# Patient Record
Sex: FEMALE | Race: WHITE | NOT HISPANIC OR LATINO
[De-identification: names, ages, dates, MRNs, and addresses within clinical notes are randomized per-mention and may not be internally consistent; named-entity substitution may affect disease eponyms.]

---

## 2019-01-22 PROBLEM — Z00.00 ENCOUNTER FOR PREVENTIVE HEALTH EXAMINATION: Status: ACTIVE | Noted: 2019-01-22

## 2019-01-25 ENCOUNTER — APPOINTMENT (OUTPATIENT)
Dept: GYNECOLOGIC ONCOLOGY | Facility: CLINIC | Age: 19
End: 2019-01-25
Payer: COMMERCIAL

## 2019-01-25 DIAGNOSIS — Z80.3 FAMILY HISTORY OF MALIGNANT NEOPLASM OF BREAST: ICD-10-CM

## 2019-01-25 DIAGNOSIS — Z87.19 PERSONAL HISTORY OF OTHER DISEASES OF THE DIGESTIVE SYSTEM: ICD-10-CM

## 2019-01-25 DIAGNOSIS — Z82.49 FAMILY HISTORY OF ISCHEMIC HEART DISEASE AND OTHER DISEASES OF THE CIRCULATORY SYSTEM: ICD-10-CM

## 2019-01-25 PROCEDURE — 99245 OFF/OP CONSLTJ NEW/EST HI 55: CPT | Mod: 25

## 2019-01-25 PROCEDURE — 76857 US EXAM PELVIC LIMITED: CPT | Mod: 59

## 2019-01-25 RX ORDER — MULTIVITAMIN
TABLET ORAL
Refills: 0 | Status: ACTIVE | COMMUNITY

## 2019-01-25 NOTE — END OF VISIT
[FreeTextEntry3] : This note accurately reflects the work and decisions made by me.\par Written by Coretta BORJA, acting as a scribe for Dr. Donis Jordan.\par

## 2019-01-25 NOTE — CHIEF COMPLAINT
[FreeTextEntry1] : [facility]\par \par [facility] [phone] at [address] [place] [address]\par

## 2019-01-25 NOTE — HISTORY OF PRESENT ILLNESS
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 Pineda, at [facility]. Recently, patient has been experiencing pelvic discomfort and a pelvic sonogram on 12/26/18 revealed a complex cystic structure in the left ovary with lobulated and irregular margins increased in size from prior study. Pelvic MRI on 1/9/19 revealed left ovarian complex cystic lesion, 5.8 x 5.8 x 5.7cm with multiple enhancing mural nodule/papillary projections. She does not recall having a  drawn. Patient is not sexually active. \par \par Never had pap smear

## 2019-01-30 ENCOUNTER — OTHER (OUTPATIENT)
Age: 19
End: 2019-01-30

## 2019-02-11 ENCOUNTER — OTHER (OUTPATIENT)
Age: 19
End: 2019-02-11

## 2019-02-13 ENCOUNTER — OTHER (OUTPATIENT)
Age: 19
End: 2019-02-13

## 2019-03-15 ENCOUNTER — OUTPATIENT (OUTPATIENT)
Dept: OUTPATIENT SERVICES | Facility: HOSPITAL | Age: 19
LOS: 1 days | End: 2019-03-15
Payer: COMMERCIAL

## 2019-03-15 ENCOUNTER — TRANSCRIPTION ENCOUNTER (OUTPATIENT)
Age: 19
End: 2019-03-15

## 2019-03-15 VITALS
TEMPERATURE: 97 F | WEIGHT: 132.72 LBS | SYSTOLIC BLOOD PRESSURE: 103 MMHG | HEIGHT: 65 IN | RESPIRATION RATE: 20 BRPM | DIASTOLIC BLOOD PRESSURE: 71 MMHG | HEART RATE: 68 BPM

## 2019-03-15 DIAGNOSIS — Z13.89 ENCOUNTER FOR SCREENING FOR OTHER DISORDER: ICD-10-CM

## 2019-03-15 DIAGNOSIS — Z01.818 ENCOUNTER FOR OTHER PREPROCEDURAL EXAMINATION: ICD-10-CM

## 2019-03-15 DIAGNOSIS — N83.299 OTHER OVARIAN CYST, UNSPECIFIED SIDE: ICD-10-CM

## 2019-03-15 DIAGNOSIS — Z90.721 ACQUIRED ABSENCE OF OVARIES, UNILATERAL: Chronic | ICD-10-CM

## 2019-03-15 DIAGNOSIS — Z29.9 ENCOUNTER FOR PROPHYLACTIC MEASURES, UNSPECIFIED: ICD-10-CM

## 2019-03-15 DIAGNOSIS — Z98.890 OTHER SPECIFIED POSTPROCEDURAL STATES: Chronic | ICD-10-CM

## 2019-03-15 LAB
ANION GAP SERPL CALC-SCNC: 14 MMOL/L — SIGNIFICANT CHANGE UP (ref 5–17)
APTT BLD: 33 SEC — SIGNIFICANT CHANGE UP (ref 27.5–36.3)
BLD GP AB SCN SERPL QL: SIGNIFICANT CHANGE UP
BUN SERPL-MCNC: 18 MG/DL — SIGNIFICANT CHANGE UP (ref 8–20)
CALCIUM SERPL-MCNC: 9.3 MG/DL — SIGNIFICANT CHANGE UP (ref 8.6–10.2)
CHLORIDE SERPL-SCNC: 102 MMOL/L — SIGNIFICANT CHANGE UP (ref 98–107)
CO2 SERPL-SCNC: 26 MMOL/L — SIGNIFICANT CHANGE UP (ref 22–29)
CREAT SERPL-MCNC: 0.61 MG/DL — SIGNIFICANT CHANGE UP (ref 0.5–1.3)
GLUCOSE SERPL-MCNC: 73 MG/DL — SIGNIFICANT CHANGE UP (ref 70–115)
HCT VFR BLD CALC: 43.2 % — SIGNIFICANT CHANGE UP (ref 37–47)
HGB BLD-MCNC: 14.3 G/DL — SIGNIFICANT CHANGE UP (ref 12–16)
INR BLD: 1.09 RATIO — SIGNIFICANT CHANGE UP (ref 0.88–1.16)
MCHC RBC-ENTMCNC: 30.8 PG — SIGNIFICANT CHANGE UP (ref 27–31)
MCHC RBC-ENTMCNC: 33.1 G/DL — SIGNIFICANT CHANGE UP (ref 32–36)
MCV RBC AUTO: 93.1 FL — SIGNIFICANT CHANGE UP (ref 81–99)
PLATELET # BLD AUTO: 282 K/UL — SIGNIFICANT CHANGE UP (ref 150–400)
POTASSIUM SERPL-MCNC: 4.1 MMOL/L — SIGNIFICANT CHANGE UP (ref 3.5–5.3)
POTASSIUM SERPL-SCNC: 4.1 MMOL/L — SIGNIFICANT CHANGE UP (ref 3.5–5.3)
PROTHROM AB SERPL-ACNC: 12.6 SEC — SIGNIFICANT CHANGE UP (ref 10–12.9)
RBC # BLD: 4.64 M/UL — SIGNIFICANT CHANGE UP (ref 4.4–5.2)
RBC # FLD: 13.1 % — SIGNIFICANT CHANGE UP (ref 11–15.6)
SODIUM SERPL-SCNC: 142 MMOL/L — SIGNIFICANT CHANGE UP (ref 135–145)
TYPE + AB SCN PNL BLD: SIGNIFICANT CHANGE UP
WBC # BLD: 5 K/UL — SIGNIFICANT CHANGE UP (ref 4.8–10.8)
WBC # FLD AUTO: 5 K/UL — SIGNIFICANT CHANGE UP (ref 4.8–10.8)

## 2019-03-15 PROCEDURE — 86901 BLOOD TYPING SEROLOGIC RH(D): CPT

## 2019-03-15 PROCEDURE — 85610 PROTHROMBIN TIME: CPT

## 2019-03-15 PROCEDURE — 85730 THROMBOPLASTIN TIME PARTIAL: CPT

## 2019-03-15 PROCEDURE — 36415 COLL VENOUS BLD VENIPUNCTURE: CPT

## 2019-03-15 PROCEDURE — 80048 BASIC METABOLIC PNL TOTAL CA: CPT

## 2019-03-15 PROCEDURE — 86850 RBC ANTIBODY SCREEN: CPT

## 2019-03-15 PROCEDURE — 85027 COMPLETE CBC AUTOMATED: CPT

## 2019-03-15 PROCEDURE — 86900 BLOOD TYPING SEROLOGIC ABO: CPT

## 2019-03-15 RX ORDER — CEFOTETAN DISODIUM 1 G
2 VIAL (EA) INJECTION ONCE
Qty: 0 | Refills: 0 | Status: DISCONTINUED | OUTPATIENT
Start: 2019-03-19 | End: 2019-03-21

## 2019-03-15 NOTE — ASU PATIENT PROFILE, ADULT - VISION (WITH CORRECTIVE LENSES IF THE PATIENT USUALLY WEARS THEM):
Partially impaired: cannot see medication labels or newsprint, but can see obstacles in path, and the surrounding layout; can count fingers at arm's length/wears glasses prn

## 2019-03-15 NOTE — H&P PST ADULT - NSANTHOSAYNRD_GEN_A_CORE
No. AMADOU screening performed.  STOP BANG Legend: 0-2 = LOW Risk; 3-4 = INTERMEDIATE Risk; 5-8 = HIGH Risk

## 2019-03-15 NOTE — H&P PST ADULT - ASSESSMENT
CAPRINI VTE 2.0 SCORE [CLOT updated 2019]    AGE RELATED RISK FACTORS                                                       MOBILITY RELATED FACTORS  [ ] Age 41-60 years                                            (1 Point)                    [ ] Bed rest                                                        (1 Point)  [ ] Age: 61-74 years                                           (2 Points)                  [ ] Plaster cast                                                   (2 Points)  [ ] Age= 75 years                                              (3 Points)                    [ ] Bed bound for more than 72 hours                 (2 Points)    DISEASE RELATED RISK FACTORS                                               GENDER SPECIFIC FACTORS  [ ] Edema in the lower extremities                       (1 Point)              [ ] Pregnancy                                                     (1 Point)  [ ] Varicose veins                                               (1 Point)                     [ ] Post-partum < 6 weeks                                   (1 Point)             [ ] BMI > 25 Kg/m2                                            (1 Point)                     [ ] Hormonal therapy  or oral contraception          (1 Point)                 [ ] Sepsis (in the previous month)                        (1 Point)               [ ] History of pregnancy complications                 (1 point)  [ ] Pneumonia or serious lung disease                                               [ ] Unexplained or recurrent                     (1 Point)           (in the previous month)                               (1 Point)  [ ] Abnormal pulmonary function test                     (1 Point)                 SURGERY RELATED RISK FACTORS  [ ] Acute myocardial infarction                              (1 Point)               [ ]  Section                                             (1 Point)  [ ] Congestive heart failure (in the previous month)  (1 Point)      [ ] Minor surgery                                                  (1 Point)   [ ] Inflammatory bowel disease                             (1 Point)               [ ] Arthroscopic surgery                                        (2 Points)  [ ] Central venous access                                      (2 Points)                [ ] General surgery lasting more than 45 minutes (2 points)  [ ] Malignancy- Present or previous                   (2 Points)                [ ] Elective arthroplasty                                         (5 points)    [ ] Stroke (in the previous month)                          (5 Points)                                                                                                                                                           HEMATOLOGY RELATED FACTORS                                                 TRAUMA RELATED RISK FACTORS  [ ] Prior episodes of VTE                                     (3 Points)                [ ] Fracture of the hip, pelvis, or leg                       (5 Points)  [ ] Positive family history for VTE                         (3 Points)             [ ] Acute spinal cord injury (in the previous month)  (5 Points)  [ ] Prothrombin 07334 A                                     (3 Points)               [ ] Paralysis  (less than 1 month)                             (5 Points)  [ ] Factor V Leiden                                             (3 Points)                  [ ] Multiple Trauma within 1 month                        (5 Points)  [ ] Lupus anticoagulants                                     (3 Points)                                                           [ ] Anticardiolipin antibodies                               (3 Points)                                                       [ ] High homocysteine in the blood                      (3 Points)                                             [ ] Other congenital or acquired thrombophilia      (3 Points)                                                [ ] Heparin induced thrombocytopenia                  (3 Points)                                     Total Score [          ]    OPIOID RISK TOOL    EDIE EACH BOX THAT APPLIES AND ADD TOTALS AT THE END    FAMILY HISTORY OF SUBSTANCE ABUSE                 FEMALE         MALE                                                Alcohol                            [    ] 1 pt         [     ] 3pts                                               Illegal Drugs                    [    ] 2 pts       [     ] 3pts                                               Rx Drugs                          [      ]4 pts      [     ] 4 pts    PERSONAL HISTORY OF SUBSTANCE ABUSE                                                                                          Alcohol                            [     ] 3 pts       [     ] 3 pts                                               Illegal Drugs                    [     ] 4 pts       [     ] 4 pts                                               Rx Drugs                          [     ] 5 pts       [     ] 5 pts    AGE BETWEEN 16-45 YEARS                                     [     ] 1 pt         [     ] 1 pt    HISTORY OF PREADOLESCENT   SEXUAL ABUSE                                                            [     ] 3 pts        [     ] 0pts    PSYCHOLOGICAL DISEASE                     ADD, OCD, Bipolar, Schizophrenia        [     ] 2 pts        [     ] 2 pts                      Depression                                               [     ] 1 pt          [     ] 1 pt           SCORING TOTAL   (add numbers and type here)              (      )                                     A score of 3 or lower indicated LOW risk for future opioid abuse  A score of 4 to 7 indicated moderate risk for future opioid abuse  A score of 8 or higher indicates a high risk for opioid abuse DEJANI VTE 2.0 SCORE [CLOT updated 2019]    AGE RELATED RISK FACTORS                                                       MOBILITY RELATED FACTORS  [ ] Age 41-60 years                                            (1 Point)                    [ ] Bed rest                                                        (1 Point)  [ ] Age: 61-74 years                                           (2 Points)                  [ ] Plaster cast                                                   (2 Points)  [ ] Age= 75 years                                              (3 Points)                    [ ] Bed bound for more than 72 hours                 (2 Points)    DISEASE RELATED RISK FACTORS                                               GENDER SPECIFIC FACTORS  [ ] Edema in the lower extremities                       (1 Point)              [ ] Pregnancy                                                     (1 Point)  [ ] Varicose veins                                               (1 Point)                     [ ] Post-partum < 6 weeks                                   (1 Point)             [ ] BMI > 25 Kg/m2                                            (1 Point)                     [ ] Hormonal therapy  or oral contraception          (1 Point)                 [ ] Sepsis (in the previous month)                        (1 Point)               [ ] History of pregnancy complications                 (1 point)  [ ] Pneumonia or serious lung disease                                               [ ] Unexplained or recurrent                     (1 Point)           (in the previous month)                               (1 Point)  [ ] Abnormal pulmonary function test                     (1 Point)                 SURGERY RELATED RISK FACTORS  [ ] Acute myocardial infarction                              (1 Point)               [ ]  Section                                             (1 Point)  [ ] Congestive heart failure (in the previous month)  (1 Point)      [ ] Minor surgery                                                  (1 Point)   [ ] Inflammatory bowel disease                             (1 Point)               [ ] Arthroscopic surgery                                        (2 Points)  [ ] Central venous access                                      (2 Points)                [ x] General surgery lasting more than 45 minutes (2 points)  [ ] Malignancy- Present or previous                   (2 Points)                [ ] Elective arthroplasty                                         (5 points)    [ ] Stroke (in the previous month)                          (5 Points)                                                                                                                                                           HEMATOLOGY RELATED FACTORS                                                 TRAUMA RELATED RISK FACTORS  [ ] Prior episodes of VTE                                     (3 Points)                [ ] Fracture of the hip, pelvis, or leg                       (5 Points)  [ ] Positive family history for VTE                         (3 Points)             [ ] Acute spinal cord injury (in the previous month)  (5 Points)  [ ] Prothrombin 74148 A                                     (3 Points)               [ ] Paralysis  (less than 1 month)                             (5 Points)  [ ] Factor V Leiden                                             (3 Points)                  [ ] Multiple Trauma within 1 month                        (5 Points)  [ ] Lupus anticoagulants                                     (3 Points)                                                           [ ] Anticardiolipin antibodies                               (3 Points)                                                       [ ] High homocysteine in the blood                      (3 Points)                                             [ ] Other congenital or acquired thrombophilia      (3 Points)                                                [ ] Heparin induced thrombocytopenia                  (3 Points)                                     Total Score [     2     ]    OPIOID RISK TOOL    EDIE EACH BOX THAT APPLIES AND ADD TOTALS AT THE END    FAMILY HISTORY OF SUBSTANCE ABUSE                 FEMALE         MALE                                                Alcohol                            [    ] 1 pt         [     ] 3pts                                               Illegal Drugs                    [    ] 2 pts       [     ] 3pts                                               Rx Drugs                          [      ]4 pts      [     ] 4 pts    PERSONAL HISTORY OF SUBSTANCE ABUSE                                                                                          Alcohol                            [     ] 3 pts       [     ] 3 pts                                               Illegal Drugs                    [     ] 4 pts       [     ] 4 pts                                               Rx Drugs                          [     ] 5 pts       [     ] 5 pts    AGE BETWEEN 16-45 YEARS                                     [  x   ] 1 pt         [     ] 1 pt    HISTORY OF PREADOLESCENT   SEXUAL ABUSE                                                            [     ] 3 pts        [     ] 0pts    PSYCHOLOGICAL DISEASE                     ADD, OCD, Bipolar, Schizophrenia        [     ] 2 pts        [     ] 2 pts                      Depression                                               [     ] 1 pt          [     ] 1 pt           SCORING TOTAL   (add numbers and type here)              (    1  )                                     A score of 3 or lower indicated LOW risk for future opioid abuse  A score of 4 to 7 indicated moderate risk for future opioid abuse  A score of 8 or higher indicates a high risk for opioid abuse

## 2019-03-15 NOTE — H&P PST ADULT - HISTORY OF PRESENT ILLNESS
This is a 18 y.o female who presents to [facility] today.  The pt underwent a sonogram, as she had a right ovarian cystectomy in December 2017, and a recent sonogram demonstrated bilateral ovarian cysts.  She is scheduled for surgery in the near future.

## 2019-03-19 ENCOUNTER — INPATIENT (INPATIENT)
Facility: HOSPITAL | Age: 19
LOS: 1 days | Discharge: HOME | DRG: 742 | End: 2019-03-21
Attending: OBSTETRICS & GYNECOLOGY | Admitting: OBSTETRICS & GYNECOLOGY
Payer: COMMERCIAL

## 2019-03-19 ENCOUNTER — RESULT REVIEW (OUTPATIENT)
Age: 19
End: 2019-03-19

## 2019-03-19 VITALS
HEART RATE: 61 BPM | HEIGHT: 65 IN | TEMPERATURE: 98 F | RESPIRATION RATE: 16 BRPM | OXYGEN SATURATION: 100 % | DIASTOLIC BLOOD PRESSURE: 60 MMHG | SYSTOLIC BLOOD PRESSURE: 109 MMHG | WEIGHT: 132.72 LBS

## 2019-03-19 DIAGNOSIS — K21.9 GASTRO-ESOPHAGEAL REFLUX DISEASE WITHOUT ESOPHAGITIS: ICD-10-CM

## 2019-03-19 DIAGNOSIS — N83.202 UNSPECIFIED OVARIAN CYST, LEFT SIDE: ICD-10-CM

## 2019-03-19 DIAGNOSIS — Z90.721 ACQUIRED ABSENCE OF OVARIES, UNILATERAL: Chronic | ICD-10-CM

## 2019-03-19 DIAGNOSIS — N83.9 NONINFLAMMATORY DISORDER OF OVARY, FALLOPIAN TUBE AND BROAD LIGAMENT, UNSPECIFIED: ICD-10-CM

## 2019-03-19 DIAGNOSIS — N83.299 OTHER OVARIAN CYST, UNSPECIFIED SIDE: ICD-10-CM

## 2019-03-19 DIAGNOSIS — R18.8 OTHER ASCITES: ICD-10-CM

## 2019-03-19 DIAGNOSIS — Z98.890 OTHER SPECIFIED POSTPROCEDURAL STATES: Chronic | ICD-10-CM

## 2019-03-19 LAB
ABO RH CONFIRMATION: SIGNIFICANT CHANGE UP
GLUCOSE BLDC GLUCOMTR-MCNC: 105 MG/DL — HIGH (ref 70–99)
GLUCOSE BLDC GLUCOMTR-MCNC: 118 MG/DL — HIGH (ref 70–99)
GLUCOSE BLDC GLUCOMTR-MCNC: 87 MG/DL — SIGNIFICANT CHANGE UP (ref 70–99)

## 2019-03-19 PROCEDURE — 44955 APPENDECTOMY ADD-ON: CPT | Mod: 59

## 2019-03-19 PROCEDURE — 49204: CPT

## 2019-03-19 PROCEDURE — 38770 REMOVE PELVIS LYMPH NODES: CPT | Mod: 59

## 2019-03-19 PROCEDURE — 88112 CYTOPATH CELL ENHANCE TECH: CPT | Mod: 26

## 2019-03-19 PROCEDURE — 88305 TISSUE EXAM BY PATHOLOGIST: CPT | Mod: 26

## 2019-03-19 PROCEDURE — 88304 TISSUE EXAM BY PATHOLOGIST: CPT | Mod: 26

## 2019-03-19 RX ORDER — CEFOTETAN DISODIUM 1 G
1 VIAL (EA) INJECTION ONCE
Qty: 0 | Refills: 0 | Status: COMPLETED | OUTPATIENT
Start: 2019-03-19 | End: 2019-03-19

## 2019-03-19 RX ORDER — HYDROMORPHONE HYDROCHLORIDE 2 MG/ML
0.5 INJECTION INTRAMUSCULAR; INTRAVENOUS; SUBCUTANEOUS
Qty: 0 | Refills: 0 | Status: DISCONTINUED | OUTPATIENT
Start: 2019-03-19 | End: 2019-03-19

## 2019-03-19 RX ORDER — NALOXONE HYDROCHLORIDE 4 MG/.1ML
0.1 SPRAY NASAL
Qty: 0 | Refills: 0 | Status: DISCONTINUED | OUTPATIENT
Start: 2019-03-19 | End: 2019-03-21

## 2019-03-19 RX ORDER — SODIUM CHLORIDE 9 MG/ML
1000 INJECTION, SOLUTION INTRAVENOUS
Qty: 0 | Refills: 0 | Status: DISCONTINUED | OUTPATIENT
Start: 2019-03-19 | End: 2019-03-19

## 2019-03-19 RX ORDER — ONDANSETRON 8 MG/1
4 TABLET, FILM COATED ORAL EVERY 6 HOURS
Qty: 0 | Refills: 0 | Status: DISCONTINUED | OUTPATIENT
Start: 2019-03-19 | End: 2019-03-21

## 2019-03-19 RX ORDER — HYDROMORPHONE HYDROCHLORIDE 2 MG/ML
30 INJECTION INTRAMUSCULAR; INTRAVENOUS; SUBCUTANEOUS
Qty: 0 | Refills: 0 | Status: DISCONTINUED | OUTPATIENT
Start: 2019-03-19 | End: 2019-03-20

## 2019-03-19 RX ORDER — METOCLOPRAMIDE HCL 10 MG
10 TABLET ORAL ONCE
Qty: 0 | Refills: 0 | Status: COMPLETED | OUTPATIENT
Start: 2019-03-19 | End: 2019-03-19

## 2019-03-19 RX ORDER — DIPHENHYDRAMINE HCL 50 MG
5 CAPSULE ORAL ONCE
Qty: 0 | Refills: 0 | Status: COMPLETED | OUTPATIENT
Start: 2019-03-19 | End: 2019-03-19

## 2019-03-19 RX ORDER — OXYCODONE AND ACETAMINOPHEN 5; 325 MG/1; MG/1
2 TABLET ORAL EVERY 4 HOURS
Qty: 0 | Refills: 0 | Status: DISCONTINUED | OUTPATIENT
Start: 2019-03-19 | End: 2019-03-21

## 2019-03-19 RX ORDER — OXYCODONE AND ACETAMINOPHEN 5; 325 MG/1; MG/1
1 TABLET ORAL EVERY 4 HOURS
Qty: 0 | Refills: 0 | Status: DISCONTINUED | OUTPATIENT
Start: 2019-03-19 | End: 2019-03-21

## 2019-03-19 RX ORDER — ENOXAPARIN SODIUM 100 MG/ML
40 INJECTION SUBCUTANEOUS EVERY 24 HOURS
Qty: 0 | Refills: 0 | Status: DISCONTINUED | OUTPATIENT
Start: 2019-03-20 | End: 2019-03-21

## 2019-03-19 RX ORDER — SODIUM CHLORIDE 9 MG/ML
3 INJECTION INTRAMUSCULAR; INTRAVENOUS; SUBCUTANEOUS ONCE
Qty: 0 | Refills: 0 | Status: DISCONTINUED | OUTPATIENT
Start: 2019-03-19 | End: 2019-03-19

## 2019-03-19 RX ORDER — KETOROLAC TROMETHAMINE 30 MG/ML
30 SYRINGE (ML) INJECTION EVERY 6 HOURS
Qty: 0 | Refills: 0 | Status: DISCONTINUED | OUTPATIENT
Start: 2019-03-19 | End: 2019-03-21

## 2019-03-19 RX ORDER — ONDANSETRON 8 MG/1
4 TABLET, FILM COATED ORAL ONCE
Qty: 0 | Refills: 0 | Status: COMPLETED | OUTPATIENT
Start: 2019-03-19 | End: 2019-03-19

## 2019-03-19 RX ORDER — ENOXAPARIN SODIUM 100 MG/ML
40 INJECTION SUBCUTANEOUS ONCE
Qty: 0 | Refills: 0 | Status: COMPLETED | OUTPATIENT
Start: 2019-03-19 | End: 2019-03-19

## 2019-03-19 RX ORDER — DEXTROSE MONOHYDRATE, SODIUM CHLORIDE, AND POTASSIUM CHLORIDE 50; .745; 4.5 G/1000ML; G/1000ML; G/1000ML
1000 INJECTION, SOLUTION INTRAVENOUS
Qty: 0 | Refills: 0 | Status: DISCONTINUED | OUTPATIENT
Start: 2019-03-19 | End: 2019-03-20

## 2019-03-19 RX ADMIN — HYDROMORPHONE HYDROCHLORIDE 0.5 MILLIGRAM(S): 2 INJECTION INTRAMUSCULAR; INTRAVENOUS; SUBCUTANEOUS at 16:30

## 2019-03-19 RX ADMIN — Medication 5 MILLIGRAM(S): at 18:45

## 2019-03-19 RX ADMIN — HYDROMORPHONE HYDROCHLORIDE 30 MILLILITER(S): 2 INJECTION INTRAMUSCULAR; INTRAVENOUS; SUBCUTANEOUS at 17:10

## 2019-03-19 RX ADMIN — HYDROMORPHONE HYDROCHLORIDE 30 MILLILITER(S): 2 INJECTION INTRAMUSCULAR; INTRAVENOUS; SUBCUTANEOUS at 20:49

## 2019-03-19 RX ADMIN — HYDROMORPHONE HYDROCHLORIDE 30 MILLILITER(S): 2 INJECTION INTRAMUSCULAR; INTRAVENOUS; SUBCUTANEOUS at 19:05

## 2019-03-19 RX ADMIN — Medication 10 MILLIGRAM(S): at 16:15

## 2019-03-19 RX ADMIN — HYDROMORPHONE HYDROCHLORIDE 0.5 MILLIGRAM(S): 2 INJECTION INTRAMUSCULAR; INTRAVENOUS; SUBCUTANEOUS at 16:45

## 2019-03-19 RX ADMIN — ONDANSETRON 4 MILLIGRAM(S): 8 TABLET, FILM COATED ORAL at 22:43

## 2019-03-19 RX ADMIN — ONDANSETRON 4 MILLIGRAM(S): 8 TABLET, FILM COATED ORAL at 16:18

## 2019-03-19 RX ADMIN — Medication 100 GRAM(S): at 22:44

## 2019-03-19 RX ADMIN — HYDROMORPHONE HYDROCHLORIDE 0.5 MILLIGRAM(S): 2 INJECTION INTRAMUSCULAR; INTRAVENOUS; SUBCUTANEOUS at 16:16

## 2019-03-19 RX ADMIN — ENOXAPARIN SODIUM 40 MILLIGRAM(S): 100 INJECTION SUBCUTANEOUS at 22:44

## 2019-03-19 NOTE — PROGRESS NOTE ADULT - ASSESSMENT
17yo s/p Ex-lap via pfannenstiel L. ovarian cystectomy, omentectomy, appendectomy, PW, FS, LND and biopsies. FS revealing borderline tumor.     PLAN:  Continue IVF at 125cc/hr, will monitor UO.   Will give benadryl stat for nausea  Continue PCA for pain control  Lovenox and SCD's for DVT prophylaxis  OOB as tolerated  Encourage IS use  Follow-up AM labs    Plan discussed with Dr. Jordan

## 2019-03-20 LAB
ALBUMIN SERPL ELPH-MCNC: 3.5 G/DL — SIGNIFICANT CHANGE UP (ref 3.3–5.2)
ALP SERPL-CCNC: 39 U/L — LOW (ref 40–120)
ALT FLD-CCNC: 9 U/L — SIGNIFICANT CHANGE UP
ANION GAP SERPL CALC-SCNC: 10 MMOL/L — SIGNIFICANT CHANGE UP (ref 5–17)
AST SERPL-CCNC: 14 U/L — SIGNIFICANT CHANGE UP
BILIRUB SERPL-MCNC: 0.5 MG/DL — SIGNIFICANT CHANGE UP (ref 0.4–2)
BUN SERPL-MCNC: 10 MG/DL — SIGNIFICANT CHANGE UP (ref 8–20)
CALCIUM SERPL-MCNC: 8.5 MG/DL — LOW (ref 8.6–10.2)
CHLORIDE SERPL-SCNC: 102 MMOL/L — SIGNIFICANT CHANGE UP (ref 98–107)
CO2 SERPL-SCNC: 26 MMOL/L — SIGNIFICANT CHANGE UP (ref 22–29)
CREAT SERPL-MCNC: 0.62 MG/DL — SIGNIFICANT CHANGE UP (ref 0.5–1.3)
EOSINOPHIL # BLD AUTO: 0 K/UL — SIGNIFICANT CHANGE UP (ref 0–0.5)
EOSINOPHIL NFR BLD AUTO: 0 % — SIGNIFICANT CHANGE UP (ref 0–6)
GLUCOSE SERPL-MCNC: 144 MG/DL — HIGH (ref 70–115)
HCT VFR BLD CALC: 35.6 % — LOW (ref 37–47)
HGB BLD-MCNC: 11.9 G/DL — LOW (ref 12–16)
LYMPHOCYTES # BLD AUTO: 1 K/UL — SIGNIFICANT CHANGE UP (ref 1–4.8)
LYMPHOCYTES # BLD AUTO: 12.5 % — LOW (ref 20–55)
MAGNESIUM SERPL-MCNC: 1.6 MG/DL — SIGNIFICANT CHANGE UP (ref 1.6–2.6)
MCHC RBC-ENTMCNC: 30.8 PG — SIGNIFICANT CHANGE UP (ref 27–31)
MCHC RBC-ENTMCNC: 33.4 G/DL — SIGNIFICANT CHANGE UP (ref 32–36)
MCV RBC AUTO: 92.2 FL — SIGNIFICANT CHANGE UP (ref 81–99)
MONOCYTES # BLD AUTO: 0.9 K/UL — HIGH (ref 0–0.8)
MONOCYTES NFR BLD AUTO: 12 % — HIGH (ref 3–10)
NEUTROPHILS # BLD AUTO: 5.8 K/UL — SIGNIFICANT CHANGE UP (ref 1.8–8)
NEUTROPHILS NFR BLD AUTO: 75.4 % — HIGH (ref 37–73)
PHOSPHATE SERPL-MCNC: 3.3 MG/DL — SIGNIFICANT CHANGE UP (ref 2.4–4.7)
PLATELET # BLD AUTO: 215 K/UL — SIGNIFICANT CHANGE UP (ref 150–400)
POTASSIUM SERPL-MCNC: 4.4 MMOL/L — SIGNIFICANT CHANGE UP (ref 3.5–5.3)
POTASSIUM SERPL-SCNC: 4.4 MMOL/L — SIGNIFICANT CHANGE UP (ref 3.5–5.3)
PROT SERPL-MCNC: 6 G/DL — LOW (ref 6.6–8.7)
RBC # BLD: 3.86 M/UL — LOW (ref 4.4–5.2)
RBC # FLD: 12.8 % — SIGNIFICANT CHANGE UP (ref 11–15.6)
SODIUM SERPL-SCNC: 138 MMOL/L — SIGNIFICANT CHANGE UP (ref 135–145)
WBC # BLD: 7.8 K/UL — SIGNIFICANT CHANGE UP (ref 4.8–10.8)
WBC # FLD AUTO: 7.8 K/UL — SIGNIFICANT CHANGE UP (ref 4.8–10.8)

## 2019-03-20 RX ADMIN — Medication 30 MILLIGRAM(S): at 00:41

## 2019-03-20 RX ADMIN — Medication 30 MILLIGRAM(S): at 23:36

## 2019-03-20 RX ADMIN — Medication 30 MILLIGRAM(S): at 00:26

## 2019-03-20 RX ADMIN — Medication 30 MILLIGRAM(S): at 13:09

## 2019-03-20 RX ADMIN — Medication 30 MILLIGRAM(S): at 18:30

## 2019-03-20 RX ADMIN — ENOXAPARIN SODIUM 40 MILLIGRAM(S): 100 INJECTION SUBCUTANEOUS at 13:09

## 2019-03-20 RX ADMIN — DEXTROSE MONOHYDRATE, SODIUM CHLORIDE, AND POTASSIUM CHLORIDE 125 MILLILITER(S): 50; .745; 4.5 INJECTION, SOLUTION INTRAVENOUS at 05:30

## 2019-03-20 RX ADMIN — OXYCODONE AND ACETAMINOPHEN 1 TABLET(S): 5; 325 TABLET ORAL at 21:05

## 2019-03-20 RX ADMIN — Medication 30 MILLIGRAM(S): at 18:26

## 2019-03-20 RX ADMIN — OXYCODONE AND ACETAMINOPHEN 1 TABLET(S): 5; 325 TABLET ORAL at 20:05

## 2019-03-20 RX ADMIN — Medication 30 MILLIGRAM(S): at 13:19

## 2019-03-20 RX ADMIN — Medication 30 MILLIGRAM(S): at 05:30

## 2019-03-20 RX ADMIN — HYDROMORPHONE HYDROCHLORIDE 30 MILLILITER(S): 2 INJECTION INTRAMUSCULAR; INTRAVENOUS; SUBCUTANEOUS at 07:25

## 2019-03-20 RX ADMIN — ONDANSETRON 4 MILLIGRAM(S): 8 TABLET, FILM COATED ORAL at 12:09

## 2019-03-20 NOTE — PROGRESS NOTE ADULT - ASSESSMENT
19 y/o female pod#1 s/p Exp lap via Pfannenstiel , left ovarian cystectomy , omentectomy , LND, with biopsy , appendectomy . with frozen section at least border line tumor

## 2019-03-20 NOTE — PROGRESS NOTE ADULT - PROBLEM SELECTOR PLAN 1
pod#1 s/p Exp lap via Pfannenstiel , left ovarian cystectomy , omentectomy , LND, with biopsy , appendectomy . with frozen section at least border line tumor

## 2019-03-20 NOTE — PROGRESS NOTE ADULT - ATTENDING COMMENTS
patient was seen and examined, when patient was able to void will d/c PCA AND iv fluids, will start on po pain meds , encourage ambulation . will d/w DR frank

## 2019-03-21 ENCOUNTER — TRANSCRIPTION ENCOUNTER (OUTPATIENT)
Age: 19
End: 2019-03-21

## 2019-03-21 VITALS
TEMPERATURE: 98 F | RESPIRATION RATE: 16 BRPM | SYSTOLIC BLOOD PRESSURE: 107 MMHG | DIASTOLIC BLOOD PRESSURE: 76 MMHG | HEART RATE: 69 BPM

## 2019-03-21 LAB
ANION GAP SERPL CALC-SCNC: 12 MMOL/L — SIGNIFICANT CHANGE UP (ref 5–17)
BASOPHILS # BLD AUTO: 0 K/UL — SIGNIFICANT CHANGE UP (ref 0–0.2)
BASOPHILS NFR BLD AUTO: 0.3 % — SIGNIFICANT CHANGE UP (ref 0–2)
BUN SERPL-MCNC: 12 MG/DL — SIGNIFICANT CHANGE UP (ref 8–20)
CALCIUM SERPL-MCNC: 8.6 MG/DL — SIGNIFICANT CHANGE UP (ref 8.6–10.2)
CHLORIDE SERPL-SCNC: 103 MMOL/L — SIGNIFICANT CHANGE UP (ref 98–107)
CO2 SERPL-SCNC: 25 MMOL/L — SIGNIFICANT CHANGE UP (ref 22–29)
CREAT SERPL-MCNC: 0.53 MG/DL — SIGNIFICANT CHANGE UP (ref 0.5–1.3)
EOSINOPHIL # BLD AUTO: 0.1 K/UL — SIGNIFICANT CHANGE UP (ref 0–0.5)
EOSINOPHIL NFR BLD AUTO: 1.4 % — SIGNIFICANT CHANGE UP (ref 0–6)
GLUCOSE SERPL-MCNC: 91 MG/DL — SIGNIFICANT CHANGE UP (ref 70–115)
HCT VFR BLD CALC: 34.1 % — LOW (ref 37–47)
HGB BLD-MCNC: 11.3 G/DL — LOW (ref 12–16)
LYMPHOCYTES # BLD AUTO: 1.7 K/UL — SIGNIFICANT CHANGE UP (ref 1–4.8)
LYMPHOCYTES # BLD AUTO: 23.3 % — SIGNIFICANT CHANGE UP (ref 20–55)
MCHC RBC-ENTMCNC: 31.2 PG — HIGH (ref 27–31)
MCHC RBC-ENTMCNC: 33.1 G/DL — SIGNIFICANT CHANGE UP (ref 32–36)
MCV RBC AUTO: 94.2 FL — SIGNIFICANT CHANGE UP (ref 81–99)
MONOCYTES # BLD AUTO: 0.8 K/UL — SIGNIFICANT CHANGE UP (ref 0–0.8)
MONOCYTES NFR BLD AUTO: 11.4 % — HIGH (ref 3–10)
NEUTROPHILS # BLD AUTO: 4.6 K/UL — SIGNIFICANT CHANGE UP (ref 1.8–8)
NEUTROPHILS NFR BLD AUTO: 63.5 % — SIGNIFICANT CHANGE UP (ref 37–73)
PLATELET # BLD AUTO: 203 K/UL — SIGNIFICANT CHANGE UP (ref 150–400)
POTASSIUM SERPL-MCNC: 4 MMOL/L — SIGNIFICANT CHANGE UP (ref 3.5–5.3)
POTASSIUM SERPL-SCNC: 4 MMOL/L — SIGNIFICANT CHANGE UP (ref 3.5–5.3)
RBC # BLD: 3.62 M/UL — LOW (ref 4.4–5.2)
RBC # FLD: 13.3 % — SIGNIFICANT CHANGE UP (ref 11–15.6)
SODIUM SERPL-SCNC: 140 MMOL/L — SIGNIFICANT CHANGE UP (ref 135–145)
WBC # BLD: 7.2 K/UL — SIGNIFICANT CHANGE UP (ref 4.8–10.8)
WBC # FLD AUTO: 7.2 K/UL — SIGNIFICANT CHANGE UP (ref 4.8–10.8)

## 2019-03-21 PROCEDURE — 36415 COLL VENOUS BLD VENIPUNCTURE: CPT

## 2019-03-21 PROCEDURE — 85027 COMPLETE CBC AUTOMATED: CPT

## 2019-03-21 PROCEDURE — 88305 TISSUE EXAM BY PATHOLOGIST: CPT

## 2019-03-21 PROCEDURE — 84100 ASSAY OF PHOSPHORUS: CPT

## 2019-03-21 PROCEDURE — 88304 TISSUE EXAM BY PATHOLOGIST: CPT

## 2019-03-21 PROCEDURE — 83735 ASSAY OF MAGNESIUM: CPT

## 2019-03-21 PROCEDURE — 88112 CYTOPATH CELL ENHANCE TECH: CPT

## 2019-03-21 PROCEDURE — 80053 COMPREHEN METABOLIC PANEL: CPT

## 2019-03-21 PROCEDURE — 80048 BASIC METABOLIC PNL TOTAL CA: CPT

## 2019-03-21 PROCEDURE — 82962 GLUCOSE BLOOD TEST: CPT

## 2019-03-21 RX ADMIN — Medication 30 MILLIGRAM(S): at 05:22

## 2019-03-21 RX ADMIN — OXYCODONE AND ACETAMINOPHEN 1 TABLET(S): 5; 325 TABLET ORAL at 12:11

## 2019-03-21 RX ADMIN — OXYCODONE AND ACETAMINOPHEN 1 TABLET(S): 5; 325 TABLET ORAL at 05:24

## 2019-03-21 RX ADMIN — ENOXAPARIN SODIUM 40 MILLIGRAM(S): 100 INJECTION SUBCUTANEOUS at 14:27

## 2019-03-21 RX ADMIN — OXYCODONE AND ACETAMINOPHEN 1 TABLET(S): 5; 325 TABLET ORAL at 13:10

## 2019-03-21 NOTE — DISCHARGE NOTE PROVIDER - NSDCFUADDINST_GEN_ALL_CORE_FT
Please contact your provider for any pain uncontrolled by medication, excessive bleeding or Fever>100.4  Please take Naprosyn 1 tab every 12 hours x 3 days, may take percocet as prescribed for breakthrough pain.

## 2019-03-21 NOTE — DISCHARGE NOTE PROVIDER - CARE PROVIDER_API CALL
Donis Jordan)  Gynecologic Oncology; Obstetrics and Gynecology  [address] [place] [address]  Phone: [phone]  Fax: [phone]  Follow Up Time:

## 2019-03-21 NOTE — PROGRESS NOTE ADULT - ATTENDING COMMENTS
patient was seen and examined , denies any nausea or vomiting this am , pain is well controlled , had some numbness in the suprapubic area and dysuria which improved from yesterday , will discharge home today after discussing with Dr frank .

## 2019-03-21 NOTE — DISCHARGE NOTE PROVIDER - INSTRUCTIONS
Continue regular diet     May walk and climb stairs as often as you would like, no vigorous activity, do not lift anything greater than 10lbs, nothing per vagina x 6 weeks, do not drive while on pain medication.

## 2019-03-21 NOTE — DISCHARGE NOTE PROVIDER - NSDCCPCAREPLAN_GEN_ALL_CORE_FT
PRINCIPAL DISCHARGE DIAGNOSIS  Diagnosis: Ovarian cyst  Assessment and Plan of Treatment: Please follow-up with PA in one week for post-op visit/removal of sutures.  Follow-up with Dr. Jordan in two weeks to review pathology report.

## 2019-03-21 NOTE — PROGRESS NOTE ADULT - PROBLEM SELECTOR PLAN 1
pod#2 s/p Exp lap via Pfannenstiel , left ovarian cystectomy , omentectomy , LND, with biopsy , appendectomy . with frozen section at least border line tumor.

## 2019-03-21 NOTE — DISCHARGE NOTE PROVIDER - HOSPITAL COURSE
Patient post-operatively had an uncomplicated hospital course. Her pain was well controlled. She is tolerating a regular diet. She is ambulating independently. She was able to void after removal of kaplan. Patient with flatus. Labs and Vitals WNL upon discharge.

## 2019-03-21 NOTE — PROGRESS NOTE ADULT - ASSESSMENT
19 y/o female pod#2 s/p Exp lap via Pfannenstiel , left ovarian cystectomy , omentectomy , LND, with biopsy , appendectomy . with frozen section at least border line tumor

## 2019-03-21 NOTE — DISCHARGE NOTE NURSING/CASE MANAGEMENT/SOCIAL WORK - NSDCDPATPORTLINK_GEN_ALL_CORE
You can access the [facility] Patient Portal, offered by [facility], by registering with the following website: [email or web address]

## 2019-03-22 PROBLEM — K29.70 GASTRITIS, UNSPECIFIED, WITHOUT BLEEDING: Chronic | Status: ACTIVE | Noted: 2019-03-15

## 2019-03-22 LAB — SURGICAL PATHOLOGY STUDY: SIGNIFICANT CHANGE UP

## 2019-03-25 ENCOUNTER — APPOINTMENT (OUTPATIENT)
Dept: GYNECOLOGIC ONCOLOGY | Facility: CLINIC | Age: 19
End: 2019-03-25
Payer: COMMERCIAL

## 2019-03-25 VITALS
HEIGHT: 65 IN | RESPIRATION RATE: 16 BRPM | SYSTOLIC BLOOD PRESSURE: 122 MMHG | DIASTOLIC BLOOD PRESSURE: 75 MMHG | OXYGEN SATURATION: 99 % | BODY MASS INDEX: 21.99 KG/M2 | HEART RATE: 78 BPM | WEIGHT: 132 LBS

## 2019-03-25 DIAGNOSIS — Z98.890 OTHER SPECIFIED POSTPROCEDURAL STATES: ICD-10-CM

## 2019-03-25 PROCEDURE — 99024 POSTOP FOLLOW-UP VISIT: CPT

## 2019-03-25 NOTE — REASON FOR VISIT
[Post Op] : post op visit [de-identified] : 3/19/19 [de-identified] : E-lap via pfannensteil, ovarian cystectomy, comprehensive surgical staging, appendectomy [de-identified] : Pt feels sore and pain is controlled with Tylenol. Denies fever, cp, sob or calf pain. Bowel and bladder function is normal. Denies VB. Denies n/v.  [Parent] : parent

## 2019-03-25 NOTE — DISCUSSION/SUMMARY
[Clean] : was clean [Dry] : was dry [Intact] : was intact [Ecchymosis] : was ecchymotic [Sutures Intact] : had sutures  intact [None] : had no drainage [Normal Skin] : normal appearance [Normal Skin Turgor] : normal skin turgor [Erythema] : was not erythematous [Seroma] : had no seroma [Firm] : soft [Tender] : nontender [Abnormal Bowel Sounds] : normal bowel sounds [Rebound] : no rebound tenderness [Guarding] : no guarding [Doing Well] : is doing well [Excellent Pain Control] : has excellent pain control [No Sign of Infection] : is showing no signs of infection [Dressing Change] : dressing changed [Clinical Recheck] : clinical recheck [de-identified] : Lungs-CTA b/l, CV-NSR, S1 S2 [de-identified] : Removed steri strips

## 2019-03-28 LAB — NON-GYNECOLOGICAL CYTOLOGY STUDY: SIGNIFICANT CHANGE UP

## 2019-03-29 ENCOUNTER — APPOINTMENT (OUTPATIENT)
Dept: GYNECOLOGIC ONCOLOGY | Facility: CLINIC | Age: 19
End: 2019-03-29
Payer: COMMERCIAL

## 2019-03-29 VITALS
SYSTOLIC BLOOD PRESSURE: 110 MMHG | WEIGHT: 130 LBS | HEIGHT: 65 IN | DIASTOLIC BLOOD PRESSURE: 72 MMHG | OXYGEN SATURATION: 99 % | HEART RATE: 89 BPM | RESPIRATION RATE: 16 BRPM | BODY MASS INDEX: 21.66 KG/M2

## 2019-03-29 DIAGNOSIS — Z85.43 PERSONAL HISTORY OF MALIGNANT NEOPLASM OF OVARY: ICD-10-CM

## 2019-03-29 DIAGNOSIS — N83.299 OTHER OVARIAN CYST, UNSPECIFIED SIDE: ICD-10-CM

## 2019-03-29 PROCEDURE — 99024 POSTOP FOLLOW-UP VISIT: CPT

## 2019-04-01 ENCOUNTER — APPOINTMENT (OUTPATIENT)
Dept: GYNECOLOGIC ONCOLOGY | Facility: CLINIC | Age: 19
End: 2019-04-01
Payer: COMMERCIAL

## 2019-04-01 VITALS
OXYGEN SATURATION: 100 % | HEIGHT: 65 IN | DIASTOLIC BLOOD PRESSURE: 68 MMHG | BODY MASS INDEX: 21.66 KG/M2 | HEART RATE: 85 BPM | SYSTOLIC BLOOD PRESSURE: 103 MMHG | RESPIRATION RATE: 16 BRPM | WEIGHT: 130 LBS

## 2019-04-01 DIAGNOSIS — T14.8XXA OTHER INJURY OF UNSPECIFIED BODY REGION, INITIAL ENCOUNTER: ICD-10-CM

## 2019-04-01 LAB
CANCER AG125 SERPL-ACNC: 54.4
CANCER AG125 SERPL-ACNC: NORMAL
CANCER AG125 SERPL-ACNC: NORMAL
CANCER ANTIGEN (CA) 125: NORMAL

## 2019-04-01 PROCEDURE — 99212 OFFICE O/P EST SF 10 MIN: CPT | Mod: 24

## 2019-04-01 NOTE — DISCUSSION/SUMMARY
[Clean] : was clean [] : was  [Ecchymosis] : was ecchymotic [Erythema] : was not erythematous [Firm] : soft [Tender] : nontender [Rebound] : no rebound tenderness [Guarding] : no guarding [FreeTextEntry1] : There is a tiny opening at left distal incision which is oozing dark blood. There is also a small area at distal third of left incision that is also oozing dark blood c/w hematoma. Incision is firm and ecchymotic throughout. Pressure applied and a moderate amount of old dark blood was drained.   [FreeTextEntry2] : dark blood [FreeTextEntry3] : Susoect incisional hematoma

## 2019-04-01 NOTE — REASON FOR VISIT
[Post Op] : post op visit [Parent] : parent [de-identified] : 3/19/19 [de-identified] : E-lap via pfannensteil, ovarian cystectomy, comprehensive surgical staging, appendectomy  [de-identified] : Pt presents today complaining of bleeding from left corner of incision which began 3 nights ago. She reports the blood is dark and profuse staining her clothes. She contacted our emergency line yesterday and was told to apply pressure with an abdominal binder. Denies fever, dizziness or significant pain.

## 2019-04-02 NOTE — END OF VISIT
[FreeTextEntry3] : Written by Peg Spears, acting as a scribe for Dr. Donis Jordan.\par This note accurately reflects the work and decision made by me.\par

## 2019-04-02 NOTE — DISCUSSION/SUMMARY
[Clean] : was clean [Dry] : was dry [Intact] : was intact [Erythema] : was not erythematous [Ecchymosis] : was not ecchymotic [Seroma] : had no seroma [None] : had no drainage [Normal Skin] : normal appearance [Firm] : soft [Tender] : nontender [Abnormal Bowel Sounds] : normal bowel sounds [Rebound] : no rebound tenderness [Guarding] : no guarding [Incisional Hernia] : no incisional hernia [Mass] : no palpable mass [Cervical Abnormality] : normal cervix [External Genitalia Abnormal] : normal external genitalia [Vaginal Exam Abnormal] : normal vaginal exam [Doing Well] : is doing well [Excellent Pain Control] : has excellent pain control [No Sign of Infection] : is showing no signs of infection [Findings] : These findings were discussed with [unfilled] in detail. She understood and accepted the rationale for this recommendation and also understood the serious impact that these findings could have upon her prognosis for survival. [FreeTextEntry1] :  Pertinent findings showed a  6cm left ovarian cystic neoplasm removed intact with otherwise normal tube and ovary.  Normal uterus and right tube and ovary.  Normal upper abdomen.  No gross evidence of a metastatic process. 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

## 2019-04-02 NOTE — REASON FOR VISIT
[de-identified] : 03/19/2019 [de-identified] : Laparoscopic left ovarian cystectomy, omentectomy, pelvic node dissection, staging and aspiration of minimal ascites for recurrent ovarian borderline tumor

## 2019-04-03 ENCOUNTER — APPOINTMENT (OUTPATIENT)
Dept: GYNECOLOGIC ONCOLOGY | Facility: CLINIC | Age: 19
End: 2019-04-03
Payer: COMMERCIAL

## 2019-04-03 VITALS
RESPIRATION RATE: 16 BRPM | BODY MASS INDEX: 21.66 KG/M2 | WEIGHT: 130 LBS | DIASTOLIC BLOOD PRESSURE: 71 MMHG | HEART RATE: 83 BPM | HEIGHT: 65 IN | OXYGEN SATURATION: 100 % | SYSTOLIC BLOOD PRESSURE: 108 MMHG

## 2019-04-03 PROCEDURE — 99024 POSTOP FOLLOW-UP VISIT: CPT

## 2019-04-03 NOTE — REASON FOR VISIT
[Parent] : parent [de-identified] : 3/19/2019 [de-identified] :  E-lap via pfannensteil, ovarian cystectomy, comprehensive surgical staging, appendectomy and she is here for a post op visit .  [de-identified] : Patient was seen by my PA on 04/01/19 with complaints of bleeding from the left corner of her incision. My PA examined the patient and  suspected an incisional hematoma. She returns today for a follow up incision check.

## 2019-04-03 NOTE — ASSESSMENT
[FreeTextEntry1] : I explained that it is a good sign that the hematoma is draining and that it may take some time to heal. Assuming patient does not develop a fever or infection I would like her to keep incision clean and dry.

## 2019-04-03 NOTE — DISCUSSION/SUMMARY
[Doing Well] : is doing well [Excellent Pain Control] : has excellent pain control [No Sign of Infection] : is showing no signs of infection

## 2019-04-19 ENCOUNTER — APPOINTMENT (OUTPATIENT)
Dept: GYNECOLOGIC ONCOLOGY | Facility: CLINIC | Age: 19
End: 2019-04-19
Payer: COMMERCIAL

## 2019-04-19 ENCOUNTER — LABORATORY RESULT (OUTPATIENT)
Age: 19
End: 2019-04-19

## 2019-04-19 VITALS
HEART RATE: 75 BPM | SYSTOLIC BLOOD PRESSURE: 108 MMHG | OXYGEN SATURATION: 99 % | WEIGHT: 130 LBS | HEIGHT: 65 IN | DIASTOLIC BLOOD PRESSURE: 68 MMHG | RESPIRATION RATE: 16 BRPM | BODY MASS INDEX: 21.66 KG/M2

## 2019-04-19 PROCEDURE — 99024 POSTOP FOLLOW-UP VISIT: CPT

## 2019-04-29 NOTE — DISCUSSION/SUMMARY
[Clean] : was clean [Dry] : was dry [Intact] : was intact [Erythema] : was not erythematous [Ecchymosis] : was not ecchymotic [Seroma] : had no seroma [None] : had no drainage [Normal Skin] : normal appearance [Firm] : soft [Tender] : nontender [Abnormal Bowel Sounds] : normal bowel sounds [Rebound] : no rebound tenderness [Guarding] : no guarding [Incisional Hernia] : no incisional hernia [Mass] : no palpable mass [Doing Well] : is doing well [Excellent Pain Control] : has excellent pain control [No Sign of Infection] : is showing no signs of infection

## 2019-04-29 NOTE — REASON FOR VISIT
[de-identified] : 3/19/19 [de-identified] : E-lap via pfannensteil, ovarian cystectomy, comprehensive surgical staging, appendectomy on 3/19/19 returns today for incision check. Patient was seen in my office on 4/3/19 for an incisional hematoma which was draining. There was no sign of infection.

## 2019-07-19 ENCOUNTER — APPOINTMENT (OUTPATIENT)
Dept: GYNECOLOGIC ONCOLOGY | Facility: CLINIC | Age: 19
End: 2019-07-19
Payer: COMMERCIAL

## 2019-07-19 PROCEDURE — 99214 OFFICE O/P EST MOD 30 MIN: CPT | Mod: 25

## 2019-07-19 PROCEDURE — 76857 US EXAM PELVIC LIMITED: CPT | Mod: 59

## 2019-08-01 NOTE — REASON FOR VISIT
[FreeTextEntry1] : [facility]\par \par [facility] [phone] at [address] [place] [id number]\par

## 2019-08-01 NOTE — PHYSICAL EXAM
[Normal] : No focal neurologic defects observed [de-identified] : Peg Spears MA was present the entire time of results and discussion

## 2019-08-01 NOTE — HISTORY OF PRESENT ILLNESS
[FreeTextEntry1] : 17 y/o s/p ex-lap via Pfannenstiel, ovarian cystectomy, comprehensive surgical staging, appendectomy on 3/19/19 returns today for a follow up ultrasound.  She feels well from a gynecological stand point. She denies pain or abnormal vaginal bleeding.

## 2019-11-28 ENCOUNTER — FORM ENCOUNTER (OUTPATIENT)
Age: 19
End: 2019-11-28

## 2019-11-29 ENCOUNTER — APPOINTMENT (OUTPATIENT)
Dept: GYNECOLOGIC ONCOLOGY | Facility: CLINIC | Age: 19
End: 2019-11-29
Payer: COMMERCIAL

## 2019-11-29 PROCEDURE — 76857 US EXAM PELVIC LIMITED: CPT | Mod: 59

## 2019-11-29 PROCEDURE — 76830 TRANSVAGINAL US NON-OB: CPT | Mod: 59

## 2019-11-29 PROCEDURE — 99214 OFFICE O/P EST MOD 30 MIN: CPT | Mod: 25

## 2020-01-06 NOTE — REASON FOR VISIT
[FreeTextEntry1] : [facility]\par \par [facility] [phone] at [address] [place] [id number]\par

## 2020-01-06 NOTE — HISTORY OF PRESENT ILLNESS
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 Genetic testing from 04/2019 was negative. Ca-125?

## 2020-01-06 NOTE — END OF VISIT
[FreeTextEntry3] : Written by Peg Spears, acting as a scribe for Dr. Donis Jordan.\par This note accurately reflects the work and decisions made by me\par

## 2020-01-06 NOTE — PHYSICAL EXAM
[Normal] : No focal neurologic defects observed [de-identified] : Peg Spears MA was present the entire time of results and discussion

## 2020-03-18 ENCOUNTER — APPOINTMENT (OUTPATIENT)
Dept: GYNECOLOGIC ONCOLOGY | Facility: CLINIC | Age: 20
End: 2020-03-18

## 2020-05-22 ENCOUNTER — APPOINTMENT (OUTPATIENT)
Dept: GYNECOLOGIC ONCOLOGY | Facility: CLINIC | Age: 20
End: 2020-05-22
Payer: COMMERCIAL

## 2020-05-22 PROCEDURE — 76830 TRANSVAGINAL US NON-OB: CPT | Mod: 59

## 2020-05-22 PROCEDURE — 93976 VASCULAR STUDY: CPT | Mod: 59

## 2020-05-22 PROCEDURE — 76857 US EXAM PELVIC LIMITED: CPT | Mod: 59

## 2020-05-22 PROCEDURE — 99214 OFFICE O/P EST MOD 30 MIN: CPT | Mod: 25

## 2020-05-27 NOTE — PHYSICAL EXAM
[Normal] : Mood and affect: Normal [de-identified] : Peg Taylor MA was present the entire time of results and discussion

## 2020-05-27 NOTE — REASON FOR VISIT
[Parent] : parent [FreeTextEntry1] : [facility]\par \par [facility] [phone] at [address] [place] [id number]\par

## 2020-05-27 NOTE — END OF VISIT
[FreeTextEntry3] : Written by Peg Taylor, acting as a scribe for Dr. Donis Jordan.\par This note accurately reflects the work and decisions made by me\par

## 2020-05-27 NOTE — HISTORY OF PRESENT ILLNESS
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 Ca-125 from 05/2020 was 17.

## 2021-02-08 NOTE — PROGRESS NOTE ADULT - SUBJECTIVE AND OBJECTIVE BOX
GYNECOLOGIC ONCOLOGY PROGRESS NOTE    POD#0    PROBLEMS: Ovarian mass, GERD    Pt seen and examined at bedside.     SUBJECTIVE:    Patient with complaint of nausea despite zofran and reglan.   Pain well-controlled.  Flatus: no  Denies Vomiting or Diarrhea.  Denies shortness of breath, chest pain or dyspnea on exertion.  Tolerating diet.    OBJECTIVE:     VITALS:  T(F): 98.5 (03-19-19 @ 15:52), Max: 98.5 (03-19-19 @ 15:52)  HR: 78 (03-19-19 @ 18:00) (60 - 92)  BP: 116/65 (03-19-19 @ 18:00) (103/58 - 121/78)  RR: 13 (03-19-19 @ 18:00) (10 - 21)  SpO2: 99% (03-19-19 @ 18:00) (99% - 100%)      I&O's Summary  [id number]-65cc's output over past few hours.     MEDICATIONS  (STANDING):  cefoTEtan  IVPB 1 Gram(s) IV Intermittent once  cefoTEtan  IVPB 2 Gram(s) IV Intermittent Once  dextrose 5% + sodium chloride 0.45% with potassium chloride 20 mEq/L 1000 milliLiter(s) (125 mL/Hr) IV Continuous <Continuous>  HYDROmorphone PCA (1 mG/mL) 30 milliLiter(s) PCA Continuous PCA Continuous  lactated ringers. 1000 milliLiter(s) (100 mL/Hr) IV Continuous <Continuous>    MEDICATIONS  (PRN):  HYDROmorphone  Injectable 0.5 milliGRAM(s) IV Push every 10 minutes PRN Moderate Pain (4 - 6)  naloxone Injectable 0.1 milliGRAM(s) IV Push every 3 minutes PRN For ANY of the following changes in patient status:  A. RR LESS THAN 10 breaths per minute, B. Oxygen saturation LESS THAN 90%, C. Sedation score of 6  ondansetron Injectable 4 milliGRAM(s) IV Push every 6 hours PRN Nausea      Physical Exam:  Constitutional: NAD  Pulmonary: clear to auscultation bilaterally   Cardiovascular: Regular rate and rhythm   Abdomen: soft, non-tender, non-distended, normal bowel sounds  Extremities: no lower extremity edema or calve tenderness, Mateo's sign negative.  Incision: Clean, dry, dressing intact.  Without signs of infection or hernia.
GYNECOLOGIC ONCOLOGY PROGRESS NOTE    POD#1    PROBLEMS:  Ovarian mass      Pt seen and examined at bedside.     SUBJECTIVE:    Patient is without acutecomplaints.  Pain well-controlled.  Flatus: negative   Denies Nausea, Vomiting or Diarrhea.  Denies shortness of breath, chest pain or dyspnea on exertion.  Tolerating diet.    OBJECTIVE:     VITALS:  T(F): 98.2 (03-20-19 @ 08:39), Max: 98.9 (03-19-19 @ 20:58)  HR: 96 (03-20-19 @ 08:39) (60 - 96)  BP: 94/62 (03-20-19 @ 08:39) (94/62 - 121/78)  RR: 18 (03-20-19 @ 08:39) (10 - 21)  SpO2: 96% (03-20-19 @ 08:39) (96% - 100%)  Wt(kg): --    I&O's Summary    19 Mar 2019 07:01  -  20 Mar 2019 07:00  --------------------------------------------------------  IN: 1675 mL / OUT: 2675 mL / NET: -1000 mL        MEDICATIONS  (STANDING):  cefoTEtan  IVPB 2 Gram(s) IV Intermittent Once  dextrose 5% + sodium chloride 0.45% with potassium chloride 20 mEq/L 1000 milliLiter(s) (125 mL/Hr) IV Continuous <Continuous>  enoxaparin Injectable 40 milliGRAM(s) SubCutaneous every 24 hours  HYDROmorphone PCA (1 mG/mL) 30 milliLiter(s) PCA Continuous PCA Continuous  ketorolac   Injectable 30 milliGRAM(s) IV Push every 6 hours    MEDICATIONS  (PRN):  naloxone Injectable 0.1 milliGRAM(s) IV Push every 3 minutes PRN For ANY of the following changes in patient status:  A. RR LESS THAN 10 breaths per minute, B. Oxygen saturation LESS THAN 90%, C. Sedation score of 6  ondansetron    Tablet 4 milliGRAM(s) Oral every 6 hours PRN Nausea and/or Vomiting  ondansetron Injectable 4 milliGRAM(s) IV Push every 6 hours PRN Nausea  oxyCODONE    5 mG/acetaminophen 325 mG 1 Tablet(s) Oral every 4 hours PRN Moderate Pain (4 - 6)  oxyCODONE    5 mG/acetaminophen 325 mG 2 Tablet(s) Oral every 4 hours PRN Severe Pain (7 - 10)      Physical Exam:  Constitutional: NAD  Pulmonary: clear to auscultation bilaterally   Cardiovascular: Regular rate and rhythm   Abdomen: soft, non-tender, non-distended, normal bowel sounds  Extremities: no lower extremity edema or calve tenderness, Mateo's sign negative.  Incision: Clean, dry, intact.  Without signs of infection or hernia.      LABS:                        11.9   7.8   )-----------( 215      ( 20 Mar 2019 07:09 )             35.6     03-20    138  |  102  |  10.0  ----------------------------<  144<H>  4.4   |  26.0  |  0.62    Ca    8.5<L>      20 Mar 2019 07:09  Phos  3.3     03-20  Mg     1.6     03-20    TPro  6.0<L>  /  Alb  3.5  /  TBili  0.5  /  DBili  x   /  AST  14  /  ALT  9   /  AlkPhos  39<L>  03-20          RADIOLOGY & ADDITIONAL TESTS:
GYNECOLOGIC ONCOLOGY PROGRESS NOTE    POD#2    PROBLEMS:  Ovarian mass      Pt seen and examined at bedside.     SUBJECTIVE:    Patient is without complaints except for mild numbness in suprapubic area   Pain well-controlled.  Flatus: negative  Denies Nausea, Vomiting or Diarrhea this AM   Denies shortness of breath, chest pain or dyspnea on exertion.  Tolerating diet.    OBJECTIVE:     VITALS:  T(F): 98.3 (03-21-19 @ 04:54), Max: 98.7 (03-20-19 @ 17:18)  HR: 64 (03-21-19 @ 04:54) (64 - 97)  BP: 100/61 (03-21-19 @ 04:54) (99/52 - 111/78)  RR: 18 (03-21-19 @ 04:54) (18 - 98)  SpO2: 98% (03-21-19 @ 04:54) (98% - 100%)  Wt(kg): --    I&O's Summary    20 Mar 2019 07:01  -  21 Mar 2019 07:00  --------------------------------------------------------  IN: 0 mL / OUT: 400 mL / NET: -400 mL        MEDICATIONS  (STANDING):  cefoTEtan  IVPB 2 Gram(s) IV Intermittent Once  enoxaparin Injectable 40 milliGRAM(s) SubCutaneous every 24 hours    MEDICATIONS  (PRN):  naloxone Injectable 0.1 milliGRAM(s) IV Push every 3 minutes PRN For ANY of the following changes in patient status:  A. RR LESS THAN 10 breaths per minute, B. Oxygen saturation LESS THAN 90%, C. Sedation score of 6  ondansetron    Tablet 4 milliGRAM(s) Oral every 6 hours PRN Nausea and/or Vomiting  ondansetron Injectable 4 milliGRAM(s) IV Push every 6 hours PRN Nausea  oxyCODONE    5 mG/acetaminophen 325 mG 1 Tablet(s) Oral every 4 hours PRN Moderate Pain (4 - 6)  oxyCODONE    5 mG/acetaminophen 325 mG 2 Tablet(s) Oral every 4 hours PRN Severe Pain (7 - 10)      Physical Exam:  Constitutional: NAD  Pulmonary: clear to auscultation bilaterally   Cardiovascular: Regular rate and rhythm   Abdomen: soft, non-tender, non-distended, normal bowel sounds  Extremities: no lower extremity edema or calve tenderness, Mateo's sign negative.  Incision: Clean, dry, intact.  Without signs of infection or hernia.      LABS:                        11.9   7.8   )-----------( 215      ( 20 Mar 2019 07:09 )             35.6     03-20    138  |  102  |  10.0  ----------------------------<  144<H>  4.4   |  26.0  |  0.62    Ca    8.5<L>      20 Mar 2019 07:09  Phos  3.3     03-20  Mg     1.6     03-20    TPro  6.0<L>  /  Alb  3.5  /  TBili  0.5  /  DBili  x   /  AST  14  /  ALT  9   /  AlkPhos  39<L>  03-20          RADIOLOGY & ADDITIONAL TESTS:
Patient seen for afternoon rounds. She reports mild nausea but otherwise tolerating a regular diet. Patient voiding after removal of kaplan, will dc PCA and begin oral pain medication. Will dc IVF as well. Patient without flatus.
36.6

## 2021-02-12 ENCOUNTER — APPOINTMENT (OUTPATIENT)
Dept: GYNECOLOGIC ONCOLOGY | Facility: CLINIC | Age: 21
End: 2021-02-12
Payer: COMMERCIAL

## 2021-02-12 VITALS
WEIGHT: 130 LBS | RESPIRATION RATE: 16 BRPM | BODY MASS INDEX: 21.66 KG/M2 | HEART RATE: 88 BPM | DIASTOLIC BLOOD PRESSURE: 64 MMHG | HEIGHT: 65 IN | SYSTOLIC BLOOD PRESSURE: 99 MMHG

## 2021-02-12 LAB — CANCER AG125 SERPL-ACNC: 34 U/ML

## 2021-02-12 PROCEDURE — 99214 OFFICE O/P EST MOD 30 MIN: CPT | Mod: 25

## 2021-02-12 PROCEDURE — 76830 TRANSVAGINAL US NON-OB: CPT | Mod: 59

## 2021-02-12 PROCEDURE — 76857 US EXAM PELVIC LIMITED: CPT | Mod: 59

## 2021-02-12 PROCEDURE — 99072 ADDL SUPL MATRL&STAF TM PHE: CPT

## 2021-02-12 PROCEDURE — 93976 VASCULAR STUDY: CPT | Mod: 59

## 2021-02-17 NOTE — HISTORY OF PRESENT ILLNESS
[FreeTextEntry1] : This 20 year old with recurrent borderline serous tumor w/ initial diagnosis 12/2017 by pediatric surgeon, s/p laparoscopic right ovarian cystectomy recurrence 3/2019, s/p Ex-lap via Pfannenstiel, left ovarian cystectomy, omentectomy, appendectomy, pelvic node dissection, staging, aspiration of minimal ascites. Patients most recent Ca125 from 2/10/21 was 34 previously 17 in May 2020  Patient was last seen in May 2020 and was advised to have a follow up ultrasound in 3 months for a follow up ultrasound but failed to do so. Patient returns to the office today for an overdue ultrasound. \par \par

## 2021-02-17 NOTE — ASSESSMENT
[FreeTextEntry1] : Ultrasound performed in office today was WNL. \par \par I discussed with patient the importance of following up more frequently. Patient stated she understood and agreed to comply. I discussed with patient that her Ca125 was a little elevated but still WNL. Patient was advised to follow up in August for a follow up ultrasound w/ a Ca125 prior to visit. Patient states she is going to [employer] school in August but is unsure of where at this time but will follow up before for an ultrasound. Patient stated she understood and agreed to comply.

## 2021-02-17 NOTE — REASON FOR VISIT
[Parent] : parent [FreeTextEntry1] : [place] Location \par \par [facility] [place]. [phone]\par [address] [place] [address]

## 2021-02-17 NOTE — END OF VISIT
[FreeTextEntry3] : Written by Shilpi Garay, acting as a scribe for Dr. Donis Jordan \par This note accurately reflects the work and decisions made by me.

## 2021-05-27 ENCOUNTER — LABORATORY RESULT (OUTPATIENT)
Age: 21
End: 2021-05-27

## 2021-05-28 ENCOUNTER — APPOINTMENT (OUTPATIENT)
Dept: GYNECOLOGIC ONCOLOGY | Facility: CLINIC | Age: 21
End: 2021-05-28
Payer: COMMERCIAL

## 2021-05-28 PROCEDURE — 76857 US EXAM PELVIC LIMITED: CPT | Mod: 59

## 2021-05-28 PROCEDURE — 76830 TRANSVAGINAL US NON-OB: CPT | Mod: 59

## 2021-05-28 PROCEDURE — 99213 OFFICE O/P EST LOW 20 MIN: CPT | Mod: 25

## 2021-05-28 PROCEDURE — 93976 VASCULAR STUDY: CPT | Mod: 59

## 2021-05-28 PROCEDURE — 99072 ADDL SUPL MATRL&STAF TM PHE: CPT

## 2021-06-01 NOTE — HISTORY OF PRESENT ILLNESS
[FreeTextEntry1] : 19 y/o LMP 5/27/21 female w/ recurrent borderline serous tumor initially diagnosed in 12/2017. She had a recurrence in 2019 for which she  underwent a laparotomy, right ovarian cystectomy, omentectomy, appendectomy, pelvic node dissection, staging and aspiration of minimal ascites. Genetic testing is negative. Pt returns today for a f/u US. Ca-125 from 5/27/21 was elevated at 67, previously 34 in 2/2021. Pt does admit to intermittent sharp pelvic pain.

## 2021-06-01 NOTE — ASSESSMENT
[FreeTextEntry1] : Ultrasound from today showed: RTO 2.5 x 2.3 x 2.0 cm. RI .52 heterogeneous area next to ovary 4.4 x 3.2 x 3.5 cm. LTO 3.5 x 3.0 x 2.3 cm. 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  I reviewed pt.'s US from today which showed a small complex left ovarian cyst and heterogeneous area next to right ovary. We discussed her elevated ca-125 which is nonspecific and can be elevated in young females for benign reasons. Given her history and recent findings I am recommending a MRI pelvis to further evaluate. Pt. mom asked if she should see SIMRAN to discuss freezing eggs. I discussed that although it is unlikely pt would ever have to remove that ovary it is a risk given hx of recurrence.

## 2021-06-01 NOTE — PHYSICAL EXAM
[Normal] : Description of patient's judgment and insight: Normal [de-identified] : Peg Taylor MA was present the entire time of results and discussion.

## 2021-06-01 NOTE — REASON FOR VISIT
[Parent] : parent [FreeTextEntry1] : [facility]\par \par [facility] [phone] at [address] [place] [address]\par

## 2021-06-07 ENCOUNTER — APPOINTMENT (OUTPATIENT)
Dept: GYNECOLOGIC ONCOLOGY | Facility: CLINIC | Age: 21
End: 2021-06-07
Payer: COMMERCIAL

## 2021-06-07 PROCEDURE — 99214 OFFICE O/P EST MOD 30 MIN: CPT | Mod: 95

## 2021-06-07 NOTE — HISTORY OF PRESENT ILLNESS
[FreeTextEntry1] : 19 y/o nulligravid female w/ a hx of recurrent borderline serous tumor initially diagnosed in 12/2017. She had a recurrence in 2019 for which she underwent a laparotomy, right ovarian cystectomy, omentectomy, appendectomy, pelvic node dissection, staging and aspiration of minimal ascites. She was recently seen in my office on 5/28 for a f/u US.  Ca-125 was elevated at 67 from 5/27/21 and she had complaints of intermittent sharp pelvic pain. US showed RTO 2.5 x 2.3 x 2.0 cm. RI.52 heterogeneous area next to ovary 4.4 x 3.2 x 3.5 cm. LTO 3.5 x 3.0 x 2.3 cm. Complex cyst 1.7 x 1.7 x 1.4 cm. Given these findings I recommended a MRI pelvis to further evaluate for possible surgical intervention. \par \par MRI pelvis 6/2/2021: There is a right paraovarian lesion w/ high T2 signal papillary fronds. There is a gradual and persistent postcontrast enhancement compatible w/ known serous surface papillary tumor w/ borderline malignant features. The enhancing component measures approx. 5.2 x 3.1 cm. \par \par Genetic testing is negative.

## 2021-06-07 NOTE — ASSESSMENT
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 I reviewed pt/ recent MRI pelvis that showed a 5 cm right paraovarian lesion compatible w/ hx of borderline tumor. We discussed the role for surgical intervention. Given the size I believe it is reasonable to believe this can be performed laparoscopically. We discussed that due to possible scar tissue procedure may be converted to a laparotomy if needed. Pt and her mom will return to my office on Wednesday to sign surgical consent.

## 2021-06-07 NOTE — END OF VISIT
[FreeTextEntry3] : Written by Peg Taylor, acting as a scribe for Dr. Donis Jordan.\par This note accurately reflects the work and decisions made by me\par  [Time Spent: ___ minutes] : I have spent [unfilled] minutes of time on the encounter.

## 2021-06-07 NOTE — REASON FOR VISIT
[Home] : at home, [unfilled] , at the time of the visit. [Other Location: e.g. Home (Enter Location, City,State)___] : at [unfilled] [Mother] : mother [FreeTextEntry1] : [facility]\par \par [facility] [phone] at [address] [place] [phone]\par

## 2021-06-08 PROBLEM — R10.2 PELVIC PAIN IN FEMALE: Status: ACTIVE | Noted: 2019-01-25

## 2021-06-11 ENCOUNTER — APPOINTMENT (OUTPATIENT)
Dept: GYNECOLOGIC ONCOLOGY | Facility: CLINIC | Age: 21
End: 2021-06-11
Payer: COMMERCIAL

## 2021-06-11 DIAGNOSIS — R10.2 PELVIC AND PERINEAL PAIN: ICD-10-CM

## 2021-06-11 PROCEDURE — 99214 OFFICE O/P EST MOD 30 MIN: CPT

## 2021-06-11 PROCEDURE — 99072 ADDL SUPL MATRL&STAF TM PHE: CPT

## 2021-06-17 ENCOUNTER — OUTPATIENT (OUTPATIENT)
Dept: OUTPATIENT SERVICES | Facility: HOSPITAL | Age: 21
LOS: 1 days | End: 2021-06-17
Payer: COMMERCIAL

## 2021-06-17 VITALS
HEART RATE: 74 BPM | WEIGHT: 139.99 LBS | DIASTOLIC BLOOD PRESSURE: 60 MMHG | TEMPERATURE: 98 F | RESPIRATION RATE: 16 BRPM | HEIGHT: 65 IN | SYSTOLIC BLOOD PRESSURE: 95 MMHG | OXYGEN SATURATION: 99 %

## 2021-06-17 DIAGNOSIS — Z13.89 ENCOUNTER FOR SCREENING FOR OTHER DISORDER: ICD-10-CM

## 2021-06-17 DIAGNOSIS — Z98.890 OTHER SPECIFIED POSTPROCEDURAL STATES: Chronic | ICD-10-CM

## 2021-06-17 DIAGNOSIS — Z92.29 PERSONAL HISTORY OF OTHER DRUG THERAPY: ICD-10-CM

## 2021-06-17 DIAGNOSIS — Z01.818 ENCOUNTER FOR OTHER PREPROCEDURAL EXAMINATION: ICD-10-CM

## 2021-06-17 DIAGNOSIS — D39.10 NEOPLASM OF UNCERTAIN BEHAVIOR OF UNSPECIFIED OVARY: ICD-10-CM

## 2021-06-17 DIAGNOSIS — Z90.721 ACQUIRED ABSENCE OF OVARIES, UNILATERAL: Chronic | ICD-10-CM

## 2021-06-17 DIAGNOSIS — Z29.9 ENCOUNTER FOR PROPHYLACTIC MEASURES, UNSPECIFIED: ICD-10-CM

## 2021-06-17 DIAGNOSIS — Z90.49 ACQUIRED ABSENCE OF OTHER SPECIFIED PARTS OF DIGESTIVE TRACT: Chronic | ICD-10-CM

## 2021-06-17 DIAGNOSIS — C50.919 MALIGNANT NEOPLASM OF UNSPECIFIED SITE OF UNSPECIFIED FEMALE BREAST: Chronic | ICD-10-CM

## 2021-06-17 LAB
ANION GAP SERPL CALC-SCNC: 10 MMOL/L — SIGNIFICANT CHANGE UP (ref 5–17)
BASOPHILS # BLD AUTO: 0.05 K/UL — SIGNIFICANT CHANGE UP (ref 0–0.2)
BASOPHILS NFR BLD AUTO: 0.9 % — SIGNIFICANT CHANGE UP (ref 0–2)
BLD GP AB SCN SERPL QL: SIGNIFICANT CHANGE UP
BUN SERPL-MCNC: 11.9 MG/DL — SIGNIFICANT CHANGE UP (ref 8–20)
CALCIUM SERPL-MCNC: 9.1 MG/DL — SIGNIFICANT CHANGE UP (ref 8.6–10.2)
CHLORIDE SERPL-SCNC: 104 MMOL/L — SIGNIFICANT CHANGE UP (ref 98–107)
CO2 SERPL-SCNC: 23 MMOL/L — SIGNIFICANT CHANGE UP (ref 22–29)
CREAT SERPL-MCNC: 0.54 MG/DL — SIGNIFICANT CHANGE UP (ref 0.5–1.3)
EOSINOPHIL # BLD AUTO: 0.08 K/UL — SIGNIFICANT CHANGE UP (ref 0–0.5)
EOSINOPHIL NFR BLD AUTO: 1.4 % — SIGNIFICANT CHANGE UP (ref 0–6)
GLUCOSE SERPL-MCNC: 90 MG/DL — SIGNIFICANT CHANGE UP (ref 70–99)
HCG SERPL-ACNC: <4 MIU/ML — SIGNIFICANT CHANGE UP
HCT VFR BLD CALC: 42.2 % — SIGNIFICANT CHANGE UP (ref 34.5–45)
HGB BLD-MCNC: 14.2 G/DL — SIGNIFICANT CHANGE UP (ref 11.5–15.5)
IMM GRANULOCYTES NFR BLD AUTO: 0.2 % — SIGNIFICANT CHANGE UP (ref 0–1.5)
LYMPHOCYTES # BLD AUTO: 1.94 K/UL — SIGNIFICANT CHANGE UP (ref 1–3.3)
LYMPHOCYTES # BLD AUTO: 33.2 % — SIGNIFICANT CHANGE UP (ref 13–44)
MCHC RBC-ENTMCNC: 31.3 PG — SIGNIFICANT CHANGE UP (ref 27–34)
MCHC RBC-ENTMCNC: 33.6 GM/DL — SIGNIFICANT CHANGE UP (ref 32–36)
MCV RBC AUTO: 93.2 FL — SIGNIFICANT CHANGE UP (ref 80–100)
MONOCYTES # BLD AUTO: 0.51 K/UL — SIGNIFICANT CHANGE UP (ref 0–0.9)
MONOCYTES NFR BLD AUTO: 8.7 % — SIGNIFICANT CHANGE UP (ref 2–14)
NEUTROPHILS # BLD AUTO: 3.26 K/UL — SIGNIFICANT CHANGE UP (ref 1.8–7.4)
NEUTROPHILS NFR BLD AUTO: 55.6 % — SIGNIFICANT CHANGE UP (ref 43–77)
PLATELET # BLD AUTO: 298 K/UL — SIGNIFICANT CHANGE UP (ref 150–400)
POTASSIUM SERPL-MCNC: 4.4 MMOL/L — SIGNIFICANT CHANGE UP (ref 3.5–5.3)
POTASSIUM SERPL-SCNC: 4.4 MMOL/L — SIGNIFICANT CHANGE UP (ref 3.5–5.3)
RBC # BLD: 4.53 M/UL — SIGNIFICANT CHANGE UP (ref 3.8–5.2)
RBC # FLD: 11.9 % — SIGNIFICANT CHANGE UP (ref 10.3–14.5)
SODIUM SERPL-SCNC: 137 MMOL/L — SIGNIFICANT CHANGE UP (ref 135–145)
WBC # BLD: 5.85 K/UL — SIGNIFICANT CHANGE UP (ref 3.8–10.5)
WBC # FLD AUTO: 5.85 K/UL — SIGNIFICANT CHANGE UP (ref 3.8–10.5)

## 2021-06-17 PROCEDURE — G0463: CPT

## 2021-06-17 RX ORDER — ALBUTEROL 90 UG/1
0 AEROSOL, METERED ORAL
Qty: 0 | Refills: 0 | DISCHARGE

## 2021-06-17 NOTE — H&P PST ADULT - HISTORY OF PRESENT ILLNESS
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 Patient states in 2019 she had a recurrence and underwent a laparotomy, right ovarian cystectomy, omentectomy, appendectomy, pelvic node dissection, staging and aspiration of minimal ascites. She was seen in Dr. Jordan's office on 5/28 for  follow up US. US  MRI pelvis 6/2/2021: There is a right paraovarian lesion w/ high T2 signal papillary fronds. There is a gradual and persistent postcontrast enhancement compatible w/ known serous surface papillary tumor w/ borderline malignant features. The enhancing component measures approximately  5.2 x 3.1cm.      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 Patient states in 2019 she had a recurrence and underwent a laparotomy, right ovarian cystectomy, omentectomy, appendectomy, pelvic node dissection, staging and aspiration of minimal ascites. States results came back borderline. States she has been following up every 3-6 months for monitoring. Her visit in February 2021, US was negative CA-125 was slightly elevated from previous year.  She was seen in Dr. Jordan's office on 5/28 for  follow up US and CA- 125. Ca-125   MRI pelvis 6/2/2021: There is a right paraovarian lesion w/ high T2 signal papillary fronds. There is a gradual and persistent postcontrast enhancement compatible w/ known serous surface papillary tumor w/ borderline malignant features. The enhancing component measures approximately  5.2 x 3.1cm.   Reports intermittent pelvic sharp pelvic pain.   Denies  20 year old nulligravid female with history of recurrent borderline serous tumor initially diagnosed in 12/2017 and COVID-19 (February 2021) presents today for PST.  Patient states in 2019 she had a recurrence and underwent a laparotomy, right ovarian cystectomy, omentectomy, appendectomy, pelvic node dissection, staging and aspiration of minimal ascites. States results came back "borderline" for malignancy States she has been following up with Dr. Jordan every 3-6 months for monitoring. Repots her visit in February 2021 showed a slight increase in her CA-125-34, but her US was negative. During her follow-up visit 5/28 her CA-125 increased with a result of 67  and her repeat US showed RTO 2.5 x 2.3 x 2.0cm. RI.52 heterogeneous area next to ovary 4.4 x 3.2 x 3.5cm.  LTO 3.5 x 3.0 x 2.3cm. Complex cyst 1.7 x 1.7 x 1.4cm. MRI pelvis 6/2/2021: There is a right paraovarian lesion w/ high T2 signal papillary fronds. There is a gradual and persistent postcontrast enhancement compatible w/ known serous surface papillary tumor w/ borderline malignant features. The enhancing component measures approximately  5.2 x 3.1cm. States paternal great grandmother with ovarian cancer and maternal great grandmother with breast cancer. States her genetic testing was negative. She reports intermittent sharp pelvic pain and heavier vaginal bleeding with menses. Denies irregular menstrual cycle, change in bowel or bladder function, chest pain, palpitations or shortness of breath. Now scheduled for laparoscopic right ovarian cystectomy, possible bilateral ovarian cystectomy with Dr. Jordan on 6/29/21.  LMP 5/27/21  COVID-19 vaccine completed Pfizer 5/22/21

## 2021-06-17 NOTE — REASON FOR VISIT
[Parent] : parent [FreeTextEntry1] : [place] Location \par \par  [facility] [place]. [phone]\par [address] [place] [address]

## 2021-06-17 NOTE — H&P PST ADULT - ACTIVITY
No formal exercise;  denies SOB with stair climbing rides bike 3x a week,  denies SOB with stair climbing

## 2021-06-17 NOTE — H&P PST ADULT - NSICDXPASTSURGICALHX_GEN_ALL_CORE_FT
PAST SURGICAL HISTORY:  S/P ovarian cystectomy Right    S/P right oophorectomy right     PAST SURGICAL HISTORY:  History of appendectomy     S/P ovarian cystectomy Right and left    S/P right oophorectomy right partial     PAST SURGICAL HISTORY:  History of appendectomy     S/P dissection of cervical lymph nodes     S/P ovarian cystectomy Right and left    S/P right oophorectomy right partial

## 2021-06-17 NOTE — H&P PST ADULT - ASSESSMENT
This is a pleasant 20 year old nulligravid female with history of recurrent borderline serous tumor initially diagnosed in 12/2017 and COVID-19 (February 2021) presents today for PST.  In 2019 she had a recurrence and underwent a laparotomy, right ovarian cystectomy, omentectomy, appendectomy, pelvic node dissection, staging and aspiration of minimal ascites. States results came back "borderline" for malignancy. In February 2021 she had an increase in her CA-125 level of 34, but her US was negative. Follow-up visit 5/28  CA-125 increased with a result of 67  and her repeat US showed RTO 2.5 x 2.3 x 2.0cm. RI.52 heterogeneous area next to ovary 4.4 x 3.2 x 3.5cm.  LTO 3.5 x 3.0 x 2.3cm. Complex cyst 1.7 x 1.7 x 1.4cm. MRI pelvis 6/2/2021: There is a right paraovarian lesion w/ high T2 signal papillary fronds. There is a gradual and persistent postcontrast enhancement compatible w/ known serous surface papillary tumor w/ borderline malignant features. The enhancing component measures approximately  5.2 x 3.1cm. States paternal great grandmother with ovarian cancer and maternal great grandmother with breast cancer. Now scheduled for laparoscopic right ovarian cystectomy, possible bilateral ovarian cystectomy with Dr. Jordan on 6/29/21.  LMP 5/27/21  COVID-19 vaccine completed Pfizer 5/22/21 This is a pleasant 20 year old nulligravid female with history of recurrent borderline serous tumor initially diagnosed in 2017 and COVID-19 (2021) presents today for PST.  In  she had a recurrence and underwent a laparotomy, right ovarian cystectomy, omentectomy, appendectomy, pelvic node dissection, staging and aspiration of minimal ascites. States results came back "borderline" for malignancy. In 2021 she had an increase in her CA-125 level of 34, but her US was negative. Follow-up visit   CA-125 increased with a result of 67  and her repeat US showed RTO 2.5 x 2.3 x 2.0cm. RI.52 heterogeneous area next to ovary 4.4 x 3.2 x 3.5cm.  LTO 3.5 x 3.0 x 2.3cm. Complex cyst 1.7 x 1.7 x 1.4cm. MRI pelvis 2021: There is a right paraovarian lesion w/ high T2 signal papillary fronds. There is a gradual and persistent postcontrast enhancement compatible w/ known serous surface papillary tumor w/ borderline malignant features. The enhancing component measures approximately  5.2 x 3.1cm. States paternal great grandmother with ovarian cancer and maternal great grandmother with breast cancer. Now scheduled for laparoscopic right ovarian cystectomy, possible bilateral ovarian cystectomy with Dr. Jordan on 21.  LMP 21  COVID-19 vaccine completed Pfizer 21    CAPRINI SCORE    AGE RELATED RISK FACTORS                                                             [ ] Age 41-60 years                                            (1 Point)  [ ] Age: 61-74 years                                           (2 Points)                 [ ] Age= 75 years                                                (3 Points)             DISEASE RELATED RISK FACTORS                                                       [ ] Edema in the lower extremities                 (1 Point)                     [ ] Varicose veins                                               (1 Point)                                 [ ] BMI > 25 Kg/m2                                            (1 Point)                                  [ ] Serious infection (ie PNA)                            (1 Point)                     [ ] Lung disease ( COPD, Emphysema)            (1 Point)                                                                          [ ] Acute myocardial infarction                         (1 Point)                  [ ] Congestive heart failure (in the previous month)  (1 Point)         [ ] Inflammatory bowel disease                            (1 Point)                  [ ] Central venous access, PICC or Port               (2 points)       (within the last month)                                                                [ ] Stroke (in the previous month)                        (5 Points)    [ ] Previous or present malignancy                       (2 points)                                                                                                                                                         HEMATOLOGY RELATED FACTORS                                                         [ ] Prior episodes of VTE                                     (3 Points)                     [ ] Positive family history for VTE                      (3 Points)                  [ ] Prothrombin 47428 A                                     (3 Points)                     [ ] Factor V Leiden                                                (3 Points)                        [ ] Lupus anticoagulants                                      (3 Points)                                                           [ ] Anticardiolipin antibodies                              (3 Points)                                                       [ ] High homocysteine in the blood                   (3 Points)                                             [ ] Other congenital or acquired thrombophilia      (3 Points)                                                [ ] Heparin induced thrombocytopenia                  (3 Points)                                        MOBILITY RELATED FACTORS  [ ] Bed rest                                                         (1 Point)  [ ] Plaster cast                                                    (2 points)  [ ] Bed bound for more than 72 hours           (2 Points)    GENDER SPECIFIC FACTORS  [ ] Pregnancy or had a baby within the last month   (1 Point)  [ ] Post-partum < 6 weeks                                   (1 Point)  [ ] Hormonal therapy  or oral contraception   (1 Point)  [ ] History of pregnancy complications              (1 point)  [ ] Unexplained or recurrent              (1 Point)    OTHER RISK FACTORS                                           (1 Point)  [ ] BMI >40, smoking, diabetes requiring insulin, chemotherapy  blood transfusions and length of surgery over 2 hours    SURGERY RELATED RISK FACTORS  [ ]  Section within the last month     (1 Point)  [ ] Minor surgery                                                  (1 Point)  [ ] Arthroscopic surgery                                       (2 Points)  [ X] Planned major surgery lasting more            (2 Points)      than 45 minutes     [ ] Elective hip or knee joint replacement       (5 points)       surgery                                                TRAUMA RELATED RISK FACTORS  [ ] Fracture of the hip, pelvis, or leg                       (5 Points)  [ ] Spinal cord injury resulting in paralysis             (5 points)       (in the previous month)    [ ] Paralysis  (less than 1 month)                             (5 Points)  [ ] Multiple Trauma within 1 month                        (5 Points)    Total Score [        ]    Caprini Score 0-2: Low Risk, NO VTE prophylaxis required for most patients, encourage ambulation  Caprini Score 3-6: Moderate Risk , pharmacologic VTE prophylaxis is indicated for most patients (in the absence of contraindications)  Caprini Score Greater than or =7: High risk, pharmocologic VTE prophylaxis indicated for most patients (in the absence of contraindications)      OPIOID RISK TOOL    EDIE EACH BOX THAT APPLIES AND ADD TOTALS AT THE END    FAMILY HISTORY OF SUBSTANCE ABUSE                 FEMALE         MALE                                                Alcohol                             [  ]1 pt          [  ]3pts                                               Illegal Durgs                     [  ]2 pts        [  ]3pts                                               Rx Drugs                           [  ]4 pts        [  ]4 pts    PERSONAL HISTORY OF SUBSTANCE ABUSE                                                                                          Alcohol                             [  ]3 pts       [  ]3 pts                                               Illegal Drugs                     [  ]4 pts        [  ]4 pts                                               Rx Drugs                           [  ]5 pts        [  ]5 pts    AGE BETWEEN 16-45 YEARS                                      [X  ]1 pt         [  ]1 pt    HISTORY OF PREADOLESCENT   SEXUAL ABUSE                                                             [  ]3 pts        [  ]0pts    PSYCHOLOGICAL DISEASE                     ADD, OCD, Bipolar, Schizophrenia        [  ]2 pts         [  ]2 pts                      Depression                                               [  ]1 pt           [  ]1 pt           SCORING TOTAL   (add numbers and type here)              (*1**)                                     A score of 3 or lower indicated LOW risk for future opioid abuse  A score of 4 to 7 indicated moderate risk for future opioid abuse  A score of 8 or higher indicates a high risk for opioid abuse

## 2021-06-17 NOTE — H&P PST ADULT - NSICDXPASTMEDICALHX_GEN_ALL_CORE_FT
PAST MEDICAL HISTORY:  Asthma Exercise induced    Gastritis     GERD (gastroesophageal reflux disease)     History of ovarian cancer     Ovarian cyst, complex      PAST MEDICAL HISTORY:  2019 novel coronavirus disease (COVID-19) Feburary 2021    Asthma Exercise induced, patient states last episode in 2014. No further episodes    COVID-19 vaccine series completed     Gastritis     GERD (gastroesophageal reflux disease)     Ovarian cyst, complex      PAST MEDICAL HISTORY:  2019 novel coronavirus disease (COVID-19) Feburary 2021    Asthma Exercise induced, patient states last episode in 2014. No further episodes    COVID-19 vaccine series completed Pfizer completed 5/22/21    Gastritis     GERD (gastroesophageal reflux disease)     Ovarian cyst, complex

## 2021-06-17 NOTE — ASU PATIENT PROFILE, ADULT - NS PRO INFO GIVEN TO
Problem: Labor (Cervical Ripen, Induct, Augment) (Adult,Obstetrics,Pediatric)  Goal: Signs and Symptoms of Listed Potential Problems Will be Absent, Minimized or Managed (Labor)  Outcome: Ongoing (interventions implemented as appropriate)         patient

## 2021-06-17 NOTE — H&P PST ADULT - PRESSURE ULCER
US showed RTO 2.5 x 2.3 x 2.0cm. RI.52 heterogeneous area next to ovary 4.4 x 3.2 x 3.5cm.  LTO 3.5 x 3.0 x 2.3cm. Complex cyst 1.7 x 1.7 x 1.4cm.

## 2021-06-17 NOTE — HISTORY OF PRESENT ILLNESS
[FreeTextEntry1] : This 20 year old nulligravid female w/ a hx of recurrent borderline serous tumor initially diagnosed in 12/2017. She had a recurrence in 2019 for which she underwent a laparotomy, right ovarian cystectomy, omentectomy, appendectomy, pelvic node dissection, staging and aspiration of minimal ascites. Patient had a recent appointment with me via telehealth on 6/7/2021 to discuss MRI and genetic testing results that were recently ordered due to  Ca-125 being elevated at 67 from 5/27/21 and she had complaint of intermittent sharp pelvic pain. US performed in my office on 5/28/21 showed RTO 2.5 x 2.3 x 2.0 cm. RI.52 heterogeneous area next to ovary 4.4 x 3.2 x 3.5 cm. LTO 3.5 x 3.0 x 2.3 cm. Complex cyst 1.7 x 1.7 x 1.4 cm. MRI pelvis 6/2/2021 impression revealed there is a right paraovarian lesion w/ high T2 signal papillary fronds. There is a gradual and persistent postcontrast enhancement compatible w/ known serous surface papillary tumor w/ borderline malignant features. The enhancing component measures approx. 5.2 x 3.1 cm. Genetic testing is negative.\par \par I reviewed patients recent MRI of the pelvis that showed a 5 cm right paraovarian lesion compatible w/ hx of borderline tumor. We discussed the role for surgical intervention. Given the size I believed it was reasonable to believe this can be performed laparoscopically. We discussed that due to possible scar tissue procedure may be converted to a laparotomy if needed. Patient returns to the office today to sign consent for Laparoscopic right ovarian cystectomy.

## 2021-06-17 NOTE — H&P PST ADULT - NSICDXPROBLEM_GEN_ALL_CORE_FT
PROBLEM DIAGNOSES  Problem: COVID-19 vaccine series completed  Assessment and Plan: Pfizer vaccine completed 5/22/21.     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 Jordan.    Problem: Need for prophylactic measure  Assessment and Plan: CAP score 2 patien Low Risk,  SCDs ordered for day of procedure.  Surgical team to assess need for VTE Prophylaxis    Problem: Screening for substance abuse  Assessment and Plan: ORT score 1 patient low risk.

## 2021-06-17 NOTE — H&P PST ADULT - NSICDXFAMILYHX_GEN_ALL_CORE_FT
FAMILY HISTORY:  No pertinent family history in first degree relatives     FAMILY HISTORY:  FH: breast cancer, maternal great grandmother  FH: ovarian cancer, paternal great grandmother    Father  Still living? Yes, Estimated age: 51-60  FH: CAD (coronary artery disease), Age at diagnosis: Age Unknown    Mother  Still living? Yes, Estimated age: 51-60  FH: diabetes mellitus, Age at diagnosis: Age Unknown

## 2021-06-17 NOTE — H&P PST ADULT - NEGATIVE GENERAL GENITOURINARY SYMPTOMS
no hematuria/no renal colic/no flank pain L/no flank pain R/no incontinence/no dysuria/normal urinary frequency

## 2021-06-17 NOTE — ASSESSMENT
[FreeTextEntry1] : I discussed with patient and her mother the need for a cystectomy. Patient is aware that I won't know for sure how much scar tissue patient has until I am doing the procedure. I advised her that there was a small risk of her procedure not being able to be done laparoscopically and an open incision will have to be made. Patient states she understands the risk and wishes to proceed with this procedure. \par \par I discussed at length with the patient the nature, purpose, risks, benefits, and alternatives of laparoscopic right ovarian cystectomy possible bilateral ovarian cystectomy.  The patient understands the risks to include (but not be limited to) bowel injury, bleeding (with the possible need for transfusion), bladder or ureteral injury, infections, deep venous thrombosis, and basil-operative death.  The patient also understands that her surgery may not be able to be performed laparoscopically and that she may need a laparotomy.  She also understands the limitations of laparoscopic surgery and the possibility of missing a surgical complication with need for subsequent re-exploration.  She agrees to proceed.  She asked numerous questions which were answered to her satisfaction.  She understands the need for a pre-operative bowel preparation and agrees to comply with our instructions.  She also understands the rationale for surgical staging should a malignancy be encountered and understands that that may require a larger surgery and even, possibly, a laparotomy.\par

## 2021-06-29 ENCOUNTER — RESULT REVIEW (OUTPATIENT)
Age: 21
End: 2021-06-29

## 2021-06-29 ENCOUNTER — OUTPATIENT (OUTPATIENT)
Dept: INPATIENT UNIT | Facility: HOSPITAL | Age: 21
LOS: 1 days | End: 2021-06-29
Payer: COMMERCIAL

## 2021-06-29 VITALS
SYSTOLIC BLOOD PRESSURE: 103 MMHG | OXYGEN SATURATION: 100 % | WEIGHT: 139.99 LBS | RESPIRATION RATE: 17 BRPM | HEART RATE: 72 BPM | DIASTOLIC BLOOD PRESSURE: 52 MMHG | HEIGHT: 65 IN | TEMPERATURE: 97 F

## 2021-06-29 VITALS
TEMPERATURE: 98 F | RESPIRATION RATE: 17 BRPM | SYSTOLIC BLOOD PRESSURE: 120 MMHG | OXYGEN SATURATION: 100 % | DIASTOLIC BLOOD PRESSURE: 85 MMHG | HEART RATE: 67 BPM

## 2021-06-29 DIAGNOSIS — Z90.721 ACQUIRED ABSENCE OF OVARIES, UNILATERAL: Chronic | ICD-10-CM

## 2021-06-29 DIAGNOSIS — D39.10 NEOPLASM OF UNCERTAIN BEHAVIOR OF UNSPECIFIED OVARY: ICD-10-CM

## 2021-06-29 DIAGNOSIS — Z98.890 OTHER SPECIFIED POSTPROCEDURAL STATES: Chronic | ICD-10-CM

## 2021-06-29 DIAGNOSIS — Z90.49 ACQUIRED ABSENCE OF OTHER SPECIFIED PARTS OF DIGESTIVE TRACT: Chronic | ICD-10-CM

## 2021-06-29 PROCEDURE — 58662 LAPAROSCOPY EXCISE LESIONS: CPT

## 2021-06-29 PROCEDURE — 88305 TISSUE EXAM BY PATHOLOGIST: CPT

## 2021-06-29 PROCEDURE — 36415 COLL VENOUS BLD VENIPUNCTURE: CPT

## 2021-06-29 PROCEDURE — C9399: CPT

## 2021-06-29 PROCEDURE — 88305 TISSUE EXAM BY PATHOLOGIST: CPT | Mod: 26

## 2021-06-29 RX ORDER — SODIUM CHLORIDE 9 MG/ML
1000 INJECTION, SOLUTION INTRAVENOUS
Refills: 0 | Status: DISCONTINUED | OUTPATIENT
Start: 2021-06-29 | End: 2021-06-29

## 2021-06-29 RX ORDER — IBUPROFEN 200 MG
1 TABLET ORAL
Qty: 0 | Refills: 0 | DISCHARGE

## 2021-06-29 RX ORDER — ONDANSETRON 8 MG/1
4 TABLET, FILM COATED ORAL ONCE
Refills: 0 | Status: DISCONTINUED | OUTPATIENT
Start: 2021-06-29 | End: 2021-06-29

## 2021-06-29 RX ORDER — OXYCODONE AND ACETAMINOPHEN 5; 325 MG/1; MG/1
1 TABLET ORAL ONCE
Refills: 0 | Status: DISCONTINUED | OUTPATIENT
Start: 2021-06-29 | End: 2021-06-29

## 2021-06-29 RX ORDER — FENTANYL CITRATE 50 UG/ML
50 INJECTION INTRAVENOUS
Refills: 0 | Status: DISCONTINUED | OUTPATIENT
Start: 2021-06-29 | End: 2021-06-29

## 2021-06-29 RX ORDER — SODIUM CHLORIDE 9 MG/ML
3 INJECTION INTRAMUSCULAR; INTRAVENOUS; SUBCUTANEOUS ONCE
Refills: 0 | Status: DISCONTINUED | OUTPATIENT
Start: 2021-06-29 | End: 2021-06-29

## 2021-06-29 RX ORDER — CEFAZOLIN SODIUM 1 G
2000 VIAL (EA) INJECTION ONCE
Refills: 0 | Status: COMPLETED | OUTPATIENT
Start: 2021-06-29 | End: 2021-06-29

## 2021-06-29 RX ADMIN — OXYCODONE AND ACETAMINOPHEN 1 TABLET(S): 5; 325 TABLET ORAL at 12:17

## 2021-06-29 RX ADMIN — Medication 100 MILLIGRAM(S): at 10:00

## 2021-06-29 NOTE — ASU DISCHARGE PLAN (ADULT/PEDIATRIC) - ASU DC SPECIAL INSTRUCTIONSFT
Follow-up with Dr. Jordan in two weeks to review pathology report. Follow-up with Dr. Jordan in two weeks to review pathology report.    Please contact your provider for any pain uncontrolled by medication, excessive bleeding or Fever>100.4  Please take Naprosyn tablet every 12 hours x 3 days, may take percocet as prescribed for breakthrough pain. Follow-up with Dr. Jordan in two weeks to review pathology report.    Please contact your provider for any pain uncontrolled by medication, excessive bleeding or Fever>100.4  Please take Naprosyn tablet every 12 hours x 3 days, may take percocet as prescribed for breakthrough pain.  Nothing in vagina, no intercourse, no douching, no tampons, no tub baths, and no swimming for about 2 weeks or until cleared by MD. Contact your doctor if you are soaking a pad every 30 minutes to an hour or experience clots. Call your doctor for any SIGNS OR SYMPTOMS OF INFECTION: Fever, chills, temperature  100.4 or higher or have a foul smelling discharge.  No creams, lotions, powders  or ointments to incision site.

## 2021-06-29 NOTE — ASU DISCHARGE PLAN (ADULT/PEDIATRIC) - CALL YOUR DOCTOR IF YOU HAVE ANY OF THE FOLLOWING:
Bleeding that does not stop/Pain not relieved by Medications/Fever greater than (need to indicate Fahrenheit or Celsius)/Nausea and vomiting that does not stop Bleeding that does not stop/Pain not relieved by Medications/Fever greater than (need to indicate Fahrenheit or Celsius)/Wound/Surgical Site with redness, or foul smelling discharge or pus/Nausea and vomiting that does not stop/Unable to urinate

## 2021-06-29 NOTE — ASU DISCHARGE PLAN (ADULT/PEDIATRIC) - CARE PROVIDER_API CALL
Donis Jordan)  Gynecologic Oncology; Obstetrics and Gynecology  [address] [place] [address]  Phone: [phone]  Fax: [phone]  Follow Up Time:

## 2021-06-30 PROBLEM — N83.299 OTHER OVARIAN CYST, UNSPECIFIED SIDE: Chronic | Status: ACTIVE | Noted: 2021-06-17

## 2021-06-30 PROBLEM — J45.909 UNSPECIFIED ASTHMA, UNCOMPLICATED: Chronic | Status: ACTIVE | Noted: 2019-03-15

## 2021-06-30 PROBLEM — K21.9 GASTRO-ESOPHAGEAL REFLUX DISEASE WITHOUT ESOPHAGITIS: Chronic | Status: ACTIVE | Noted: 2021-06-17

## 2021-06-30 PROBLEM — Z92.29 PERSONAL HISTORY OF OTHER DRUG THERAPY: Chronic | Status: ACTIVE | Noted: 2021-06-17

## 2021-06-30 PROBLEM — U07.1 COVID-19: Chronic | Status: ACTIVE | Noted: 2021-06-17

## 2021-07-02 LAB — SURGICAL PATHOLOGY STUDY: SIGNIFICANT CHANGE UP

## 2021-07-09 ENCOUNTER — APPOINTMENT (OUTPATIENT)
Dept: GYNECOLOGIC ONCOLOGY | Facility: CLINIC | Age: 21
End: 2021-07-09
Payer: COMMERCIAL

## 2021-07-09 VITALS — HEART RATE: 71 BPM | DIASTOLIC BLOOD PRESSURE: 72 MMHG | SYSTOLIC BLOOD PRESSURE: 108 MMHG | OXYGEN SATURATION: 100 %

## 2021-07-09 DIAGNOSIS — B37.3 CANDIDIASIS OF VULVA AND VAGINA: ICD-10-CM

## 2021-07-09 PROCEDURE — 99072 ADDL SUPL MATRL&STAF TM PHE: CPT

## 2021-07-09 PROCEDURE — 99213 OFFICE O/P EST LOW 20 MIN: CPT | Mod: 24

## 2021-07-10 PROBLEM — B37.3 VAGINAL CANDIDIASIS: Status: ACTIVE | Noted: 2021-07-09

## 2021-07-10 NOTE — ASSESSMENT
[FreeTextEntry1] : I reviewed patient's final pathology w/ her and her mother which was consistent with a recurrent borderline tumor. I discussed my intraoperative findings and that we were able to preserve of ovary. It is unclear when and or if it will recur again. Due to the uncertainty we discussed the benefit of consulting with [facility] to discuss freezing embryos. I have asked the patient to return to my office in 3 months for an US and to have a ca-125 blood test a week prior to that visit.

## 2021-07-10 NOTE — DISCUSSION/SUMMARY
[Dry] : was dry [Intact] : was intact [None] : had no drainage [Normal Skin] : normal appearance [Normal Skin Turgor] : normal skin turgor [Doing Well] : is doing well [Excellent Pain Control] : has excellent pain control [No Sign of Infection] : is showing no signs of infection [Findings] : These findings were discussed with [unfilled] in detail. She understood and accepted the rationale for this recommendation and also understood the serious impact that these findings could have upon her prognosis for survival. [Clean] : was not clean [Erythema] : was not erythematous [Ecchymosis] : was not ecchymotic [Seroma] : had no seroma [Firm] : soft [Tender] : nontender [Abnormal Bowel Sounds] : normal bowel sounds [Rebound] : no rebound tenderness [Guarding] : no guarding [Incisional Hernia] : no incisional hernia [Mass] : no palpable mass [External Genitalia Abnormal] : normal external genitalia [Vaginal Exam Abnormal] : normal vaginal exam [FreeTextEntry1] : Pertinent findings: 8 cm fungating mass on a narrow stalk coming off of the serosa of the right ovary. Otherwise normal right ovary. Normal uterus, tubes and left ovary. Normal upper abdomen. \par \par Final pathology: right ovarian mass, surgical excision: fragments of serous borderline tumor

## 2021-07-10 NOTE — REASON FOR VISIT
[Post Op] : post op visit [Parent] : parent [de-identified] : 6/29/2021 [de-identified] : Laparoscopic excision of right ovarian serosal mass for recurrent serous borderline tumor of the right ovary. Surgery was performed at [facility] on 6/29/2021 [de-identified] : Pt reports an excellent recovery. She denies pelvic pain, fever, SOB, calf pain, changes in bowel and bladder function. Pt has complaints of vaginal itch, she believes she may have a yeast infection.

## 2021-07-30 ENCOUNTER — APPOINTMENT (OUTPATIENT)
Dept: GYNECOLOGIC ONCOLOGY | Facility: CLINIC | Age: 21
End: 2021-07-30

## 2021-10-05 ENCOUNTER — FORM ENCOUNTER (OUTPATIENT)
Age: 21
End: 2021-10-05

## 2021-11-26 ENCOUNTER — APPOINTMENT (OUTPATIENT)
Dept: GYNECOLOGIC ONCOLOGY | Facility: CLINIC | Age: 21
End: 2021-11-26
Payer: COMMERCIAL

## 2021-11-26 PROCEDURE — 99213 OFFICE O/P EST LOW 20 MIN: CPT | Mod: 25

## 2021-11-26 PROCEDURE — 76830 TRANSVAGINAL US NON-OB: CPT | Mod: 59

## 2021-11-26 PROCEDURE — 93976 VASCULAR STUDY: CPT | Mod: 59

## 2021-11-26 PROCEDURE — 76857 US EXAM PELVIC LIMITED: CPT | Mod: 59

## 2021-12-13 NOTE — END OF VISIT
[FreeTextEntry3] : Written by Sanjana Falk, acting as a scribe for Dr. Donis Jordan.\par This note accurately reflects the work and decisions made by me.

## 2021-12-13 NOTE — HISTORY OF PRESENT ILLNESS
[FreeTextEntry1] : 22 y/o with a history of recurrent serous borderline tumor of the right ovary s/p laparoscopic excision of right ovarian serosal mass on 6/29/2021. We previously discussed my recommendation to consult with [facility] and consider freezing her embryos given the uncertainty of whether the borderline tumor will recur. She returns today for a f/u ultrasound to evaluate her ovaries. Patient has not yet consulted with fertility specialists. \par \par  (11/23/21): 21

## 2021-12-13 NOTE — ASSESSMENT
[FreeTextEntry1] : Ultrasound performed in office revealed UT 9.1 x 5.3 x 4.7 cm w/trace endometrial fluid. Endo thickness: 13.1. RTO with multiple subcentimeter cysts, largest = 0.8 cm w/septation. RI: 0.55. LTO with multiple subcentimeter cysts, 2.0 cm corpus luteal cyst . Trace amount of free fluid present in pelvis. \par \par I reviewed the patient's US with her. I also reviewed her blood work which is very reassuring compared to prior results being in the 60's in May. I emphasized the importance of consulting with [facility] if she ultimately wants to pursue child bearing. Doing so will optimize her opportunity to have a family given potential BSO. Patient understands and will decide whether she wants to consult with [facility]RAN at this time. She should return for a repeat US in April with a  blood test prior to her next visit.

## 2021-12-13 NOTE — REASON FOR VISIT
[FreeTextEntry1] : [place] Location\par \par [facility] [phone] at [address] [place] [id number]\par  \par

## 2021-12-26 ENCOUNTER — TRANSCRIPTION ENCOUNTER (OUTPATIENT)
Age: 21
End: 2021-12-26

## 2022-05-10 PROBLEM — D39.10 SEROUS SURFACE PAPILLARY TUMOR WITH BORDERLINE MALIGNANT FEATURES: Status: ACTIVE | Noted: 2019-01-25

## 2022-05-11 ENCOUNTER — APPOINTMENT (OUTPATIENT)
Dept: GYNECOLOGIC ONCOLOGY | Facility: CLINIC | Age: 22
End: 2022-05-11
Payer: COMMERCIAL

## 2022-05-11 DIAGNOSIS — D39.10 NEOPLASM OF UNCERTAIN BEHAVIOR OF UNSPECIFIED OVARY: ICD-10-CM

## 2022-05-11 PROCEDURE — 76857 US EXAM PELVIC LIMITED: CPT | Mod: 59

## 2022-05-11 PROCEDURE — 99213 OFFICE O/P EST LOW 20 MIN: CPT | Mod: 25

## 2022-05-11 PROCEDURE — 76830 TRANSVAGINAL US NON-OB: CPT | Mod: 59

## 2022-05-11 PROCEDURE — 93976 VASCULAR STUDY: CPT | Mod: 59

## 2022-05-12 NOTE — REASON FOR VISIT
[Parent] : parent [FreeTextEntry1] : [place] Location \par \par [facility] [place]. [phone]\par [address] [place] [address] \par \par -Hx of recurrence serouse borderline tumor of the right ovary. \par -S/p lap exicision of right ovarian serosal mass 6/29/21. \par -Recommended consultation with [facility] to freeze eggs. \par -Ca125 14 on 5/6/22\par -F/u US

## 2022-05-12 NOTE — HISTORY OF PRESENT ILLNESS
[FreeTextEntry1] : 22 y/o with a history of recurrent serous borderline tumor of the right ovary s/p laparoscopic excision of right ovarian serosal mass on 6/29/2021. We previously discussed my recommendation to consult with [facility]RAN and consider freezing her embryos given the uncertainty of whether the borderline tumor will recur. She returns today for a f/u ultrasound to evaluate her ovaries. \par \par Prev US from 11/26/22 revealed UT 9.1 x 5.3 x 4.7 cm w/trace endometrial fluid. Endo thickness: 13.1. RTO with multiple subcentimeter cysts, largest = 0.8 cm w/septation. RI: 0.55. LTO with multiple subcentimeter cysts, 2.0 cm corpus luteal cyst. Trace amount of free fluid present in pelvis. \par \par She returns to the office today for a follow up ultrasound. \par Recent Ca125 from 5/6/22 was 14\par

## 2022-05-12 NOTE — PHYSICAL EXAM
[Normal] : Mood and affect: Normal [FreeTextEntry1] : Shilpi Garay Medical assistant was present during results and discussion.

## 2022-05-12 NOTE — ASSESSMENT
[FreeTextEntry1] : Ultrasound performed in office today revealed UT 9.1 x 5.6 x 7.0 cm, endometrial thickness 18.4 mm\par RTO: normal 2.6 x 1.4 x 2.5 cm\par LTO: abnormal 3.9 x 3.3 x 3.6 cm, cyst clear = 1.8 x 2.2 x 2.5 cm\par free fluid present - post cul-de-sac\par \par Pt reports that in her mind she has been putting off consultation with [facility]RAN. Patient reports being busy with school and is unsure of what she wants to do in the future. I advised the patient the importance of having egg retrievals to fall back on. Advised her that her US today remains stable and her blood work is wnl. Patient will follow up in November for an US.

## 2022-12-21 ENCOUNTER — APPOINTMENT (OUTPATIENT)
Dept: GYNECOLOGIC ONCOLOGY | Facility: CLINIC | Age: 22
End: 2022-12-21

## 2022-12-21 PROCEDURE — 99212 OFFICE O/P EST SF 10 MIN: CPT | Mod: 25

## 2022-12-21 PROCEDURE — 76830 TRANSVAGINAL US NON-OB: CPT | Mod: 59

## 2022-12-21 PROCEDURE — 76857 US EXAM PELVIC LIMITED: CPT | Mod: 59

## 2022-12-21 NOTE — ASSESSMENT
[FreeTextEntry1] : 21 yo pt with a history of recurrent serous borderline tumor initially on the left ovary, but most recently an implant off the right ovary was resected by DR. Jordan on 6/29/2021.  previously discussed my recommendation to consult with [facility]RAN and consider freezing her embryos given the uncertainty of whether the borderline tumor will recur. \par \par \par Ultrasound done in office reveals: normal functional right ovarian cyst. normal uterus. and left ovary. No free fluid. No nodularity.

## 2022-12-21 NOTE — END OF VISIT
[FreeTextEntry3] : follow up with US in 6 months \par  [FreeTextEntry2] : Shilpi Garay MA was present the entire duration of the patient interaction and gynecological exam.\par

## 2022-12-21 NOTE — REASON FOR VISIT
[FreeTextEntry1] : [place] Location \par [facility] \par  \par [address]\par [place] [id number]\par [phone]\par

## 2022-12-21 NOTE — HISTORY OF PRESENT ILLNESS
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  previously discussed my recommendation to consult with [facility] and consider freezing her embryos given the uncertainty of whether the borderline tumor will recur. She returns today for a f/u ultrasound to evaluate her ovaries. \par \par Ultrasound performed on her last visit  05/11/2022 revealed: \par UT 9.1 x 5.6 x 7.0 cm, endometrial thickness 18.4 mm\par RTO: normal 2.6 x 1.4 x 2.5 cm\par LTO: abnormal 3.9 x 3.3 x 3.6 cm, cyst clear = 1.8 x 2.2 x 2.5 cm\par free fluid present - post cul-de-sac\par \par  12/19/22: 18.4\par \par no new gynecologic issues. no new medical/surgical issues.

## 2023-05-11 ENCOUNTER — OUTPATIENT (OUTPATIENT)
Dept: OUTPATIENT SERVICES | Facility: HOSPITAL | Age: 23
LOS: 1 days | Discharge: HOME | End: 2023-05-11

## 2023-05-11 DIAGNOSIS — Z98.890 OTHER SPECIFIED POSTPROCEDURAL STATES: Chronic | ICD-10-CM

## 2023-05-11 DIAGNOSIS — Z90.721 ACQUIRED ABSENCE OF OVARIES, UNILATERAL: Chronic | ICD-10-CM

## 2023-05-11 DIAGNOSIS — R59.0 LOCALIZED ENLARGED LYMPH NODES: ICD-10-CM

## 2023-05-11 DIAGNOSIS — Z90.49 ACQUIRED ABSENCE OF OTHER SPECIFIED PARTS OF DIGESTIVE TRACT: Chronic | ICD-10-CM

## 2023-05-22 ENCOUNTER — RESULT REVIEW (OUTPATIENT)
Age: 23
End: 2023-05-22

## 2023-05-22 ENCOUNTER — NON-APPOINTMENT (OUTPATIENT)
Age: 23
End: 2023-05-22

## 2023-05-22 ENCOUNTER — APPOINTMENT (OUTPATIENT)
Dept: HEMATOLOGY ONCOLOGY | Facility: CLINIC | Age: 23
End: 2023-05-22
Payer: COMMERCIAL

## 2023-05-22 VITALS
WEIGHT: 155.04 LBS | OXYGEN SATURATION: 99 % | BODY MASS INDEX: 25.83 KG/M2 | SYSTOLIC BLOOD PRESSURE: 112 MMHG | DIASTOLIC BLOOD PRESSURE: 73 MMHG | HEIGHT: 65 IN | HEART RATE: 69 BPM

## 2023-05-22 LAB
ANISOCYTOSIS BLD QL: SLIGHT — SIGNIFICANT CHANGE UP
BASOPHILS # BLD AUTO: 0.1 K/UL — SIGNIFICANT CHANGE UP (ref 0–0.2)
BASOPHILS NFR BLD AUTO: 1.3 % — SIGNIFICANT CHANGE UP (ref 0–2)
EOSINOPHIL # BLD AUTO: 0 K/UL — SIGNIFICANT CHANGE UP (ref 0–0.5)
EOSINOPHIL NFR BLD AUTO: 0.8 % — SIGNIFICANT CHANGE UP (ref 0–6)
HCT VFR BLD CALC: 42.4 % — SIGNIFICANT CHANGE UP (ref 34.5–45)
HGB BLD-MCNC: 14.3 G/DL — SIGNIFICANT CHANGE UP (ref 11.5–15.5)
LYMPHOCYTES # BLD AUTO: 1.3 K/UL — SIGNIFICANT CHANGE UP (ref 1–3.3)
LYMPHOCYTES # BLD AUTO: 30.9 % — SIGNIFICANT CHANGE UP (ref 13–44)
MACROCYTES BLD QL: SLIGHT — SIGNIFICANT CHANGE UP
MCHC RBC-ENTMCNC: 31.3 PG — SIGNIFICANT CHANGE UP (ref 27–34)
MCHC RBC-ENTMCNC: 33.8 G/DL — SIGNIFICANT CHANGE UP (ref 32–36)
MCV RBC AUTO: 92.6 FL — SIGNIFICANT CHANGE UP (ref 80–100)
MICROCYTES BLD QL: SLIGHT — SIGNIFICANT CHANGE UP
MONOCYTES # BLD AUTO: 0.3 K/UL — SIGNIFICANT CHANGE UP (ref 0–0.9)
MONOCYTES NFR BLD AUTO: 7.5 % — SIGNIFICANT CHANGE UP (ref 2–14)
NEUTROPHILS # BLD AUTO: 2.4 K/UL — SIGNIFICANT CHANGE UP (ref 1.8–7.4)
NEUTROPHILS NFR BLD AUTO: 59.6 % — SIGNIFICANT CHANGE UP (ref 43–77)
PLAT MORPH BLD: NORMAL — SIGNIFICANT CHANGE UP
PLATELET # BLD AUTO: 283 K/UL — SIGNIFICANT CHANGE UP (ref 150–400)
POIKILOCYTOSIS BLD QL AUTO: SLIGHT — SIGNIFICANT CHANGE UP
RBC # BLD: 4.58 M/UL — SIGNIFICANT CHANGE UP (ref 3.8–5.2)
RBC # FLD: 11.7 % — SIGNIFICANT CHANGE UP (ref 10.3–14.5)
RBC BLD AUTO: SIGNIFICANT CHANGE UP
WBC # BLD: 4.1 K/UL — SIGNIFICANT CHANGE UP (ref 3.8–10.5)
WBC # FLD AUTO: 4.1 K/UL — SIGNIFICANT CHANGE UP (ref 3.8–10.5)

## 2023-05-22 PROCEDURE — 99204 OFFICE O/P NEW MOD 45 MIN: CPT

## 2023-05-22 RX ORDER — FLUCONAZOLE 150 MG/1
150 TABLET ORAL
Qty: 1 | Refills: 0 | Status: DISCONTINUED | COMMUNITY
Start: 2021-07-09 | End: 2023-05-22

## 2023-05-22 NOTE — PHYSICAL EXAM
[Normal] : normal appearance, no rash, nodules, vesicles, ulcers, erythema [de-identified] : right cervical adenopathy, R LN measuring at least 3 cm.  [de-identified] : Soft, non tender, non distended [de-identified] : no axillary adenopathy

## 2023-05-22 NOTE — HISTORY OF PRESENT ILLNESS
[de-identified] : Ms. 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 Pineda, at [facility]. Pelvic MRI on 1/9/19 revealed left ovarian complex cystic lesion, 5.8 x 5.8 x 5.7cm with multiple enhancing mural nodule/papillary projections s/p laparoscopic excision of right ovarian serosal mass on 6/29/2021 with Dr Jordan.\par \par \par 5/27/2021  67 U/mL \par 5/28/2021 US Pelvis- right ovary 2.5cm with heterogenous area adjacent 4.4 X 3.2 X 3.5cm and left ovary 3.5cm with complex cyst 1.7cm\par \par 6/29/2021 Right ovarian mass, surgical excision: Fragments of serous borderline tumor\par \par 3/12/2023 US head + neck- At the site of palpable abnormality in level two of the right neck there is an elongated lymph node which has an otherwise normal appearance. This is probably a benign reactive lymph node.\par \par FMHx: Maternal grandmother: Breast cancer, Paternal grandmother: Ovarian cancer\par \par \par Patient presents for an initial consultation\par Patient reports excision of ovarian masses dating from 12/27/17, 1/19/2019, most recent 6/29/2021. \par No fevers chills repeated infections No B symptoms/ night sweats/ weight loss/ pain\par Reports R cervical LN enlargement first noticed 5 years ago, hasn’t decreased in size. 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

## 2023-05-22 NOTE — ADDENDUM
[FreeTextEntry1] : Documented by Magi Riojas acting as scribe for  on 05/22/2023 \par \par All Medical record entries made by the Scribe were at my, 's , direction and personally dictated by me on 05/22/2023 . I have reviewed the chart and agree that the record accurately reflects my personal performance of the history, physical exam, assessment and plan. I have also personally directed, reviewed, and agreed with the discharge instructions.\par

## 2023-05-22 NOTE — ASSESSMENT
[FreeTextEntry1] : 22 year old female PMH history of of serous cystadenomas of the ovaries / borderline tumor,  s/p laparoscopic cystectomy on 12/27/17, s/p laparoscopic excision of right ovarian serosal mass on 6/29/2021 with Dr Jordan.\par \par 3/12/2023 US head + neck- At the site of palpable abnormality in level two of the right neck there is an elongated lymph node which has an otherwise normal appearance. This is probably a benign reactive lymph node.\par \par She is now presenting with R cervical adenopathy x 5 years. No B symptoms. \par \par \par Plan: \par Labs today\par  IR for US guided core biopsy of cervical lymph node, patient agreeable. \par F/u in 6 weeks to discuss results of biopsy

## 2023-05-22 NOTE — CONSULT LETTER
[Dear  ___] : Dear  [unfilled], [Consult Letter:] : I had the pleasure of evaluating your patient, [unfilled]. [Please see my note below.] : Please see my note below. [Consult Closing:] : Thank you very much for allowing me to participate in the care of this patient.  If you have any questions, please do not hesitate to contact me. [Sincerely,] : Sincerely, [FreeTextEntry3] : Anahi Nunez MD\par Medical Oncology/Hematology\par [facility] [place], [facility]\par \par \par [facility] [employer] [facility] at [facility]\par

## 2023-05-24 LAB
ALBUMIN SERPL ELPH-MCNC: 4.7 G/DL
ALP BLD-CCNC: 53 U/L
ALT SERPL-CCNC: 15 U/L
ANION GAP SERPL CALC-SCNC: 8 MMOL/L
AST SERPL-CCNC: 19 U/L
BILIRUB SERPL-MCNC: 0.3 MG/DL
BUN SERPL-MCNC: 9 MG/DL
CALCIUM SERPL-MCNC: 9.8 MG/DL
CHLORIDE SERPL-SCNC: 103 MMOL/L
CO2 SERPL-SCNC: 28 MMOL/L
CREAT SERPL-MCNC: 0.64 MG/DL
EGFR: 126 ML/MIN/1.73M2
LDH SERPL-CCNC: 140 U/L
POTASSIUM SERPL-SCNC: 4.5 MMOL/L
PROT SERPL-MCNC: 7.4 G/DL
SODIUM SERPL-SCNC: 139 MMOL/L

## 2023-06-03 NOTE — H&P PST ADULT - DENTITION
stable on reassessment. Conversation had with patient regarding results of lab work and imaging. Patient agrees with plan for discharge at this time. Patient agrees to comply with follow up to primary care doctor. Return to ED precautions and discharge instructions discussed with patient with verbal understanding. -DO Delicia
normal

## 2023-07-20 ENCOUNTER — OUTPATIENT (OUTPATIENT)
Dept: OUTPATIENT SERVICES | Facility: HOSPITAL | Age: 23
LOS: 1 days | Discharge: HOME | End: 2023-07-20

## 2023-07-20 DIAGNOSIS — Z98.890 OTHER SPECIFIED POSTPROCEDURAL STATES: Chronic | ICD-10-CM

## 2023-07-20 DIAGNOSIS — R59.0 LOCALIZED ENLARGED LYMPH NODES: ICD-10-CM

## 2023-07-20 DIAGNOSIS — Z90.49 ACQUIRED ABSENCE OF OTHER SPECIFIED PARTS OF DIGESTIVE TRACT: Chronic | ICD-10-CM

## 2023-07-20 DIAGNOSIS — Z90.721 ACQUIRED ABSENCE OF OVARIES, UNILATERAL: Chronic | ICD-10-CM

## 2023-07-24 ENCOUNTER — APPOINTMENT (OUTPATIENT)
Dept: HEMATOLOGY ONCOLOGY | Facility: CLINIC | Age: 23
End: 2023-07-24
Payer: COMMERCIAL

## 2023-07-24 DIAGNOSIS — R59.0 LOCALIZED ENLARGED LYMPH NODES: ICD-10-CM

## 2023-07-24 PROCEDURE — 99446 NTRPROF PH1/NTRNET/EHR 5-10: CPT

## 2023-07-24 NOTE — CONSULT LETTER
[Dear  ___] : Dear  [unfilled], [Consult Letter:] : I had the pleasure of evaluating your patient, [unfilled]. [Please see my note below.] : Please see my note below. [Consult Closing:] : Thank you very much for allowing me to participate in the care of this patient.  If you have any questions, please do not hesitate to contact me. [Sincerely,] : Sincerely, [FreeTextEntry3] : Anahi Nunez MD\par Medical Oncology/Hematology\par [facility] [place], [facility]\par \par \par [facility] at [facility]\par

## 2023-07-24 NOTE — PHYSICAL EXAM
[Normal] : affect appropriate [de-identified] : right cervical adenopathy, R LN measuring at least 3 cm.  [de-identified] : Soft, non tender, non distended [de-identified] : no axillary adenopathy

## 2023-07-24 NOTE — HISTORY OF PRESENT ILLNESS
[de-identified] : Ms. 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 Pineda, at [facility]. Pelvic MRI on 1/9/19 revealed left ovarian complex cystic lesion, 5.8 x 5.8 x 5.7cm with multiple enhancing mural nodule/papillary projections s/p laparoscopic excision of right ovarian serosal mass on 6/29/2021 with Dr Jordan.\par \par \par 5/27/2021  67 U/mL \par 5/28/2021 US Pelvis- right ovary 2.5cm with heterogenous area adjacent 4.4 X 3.2 X 3.5cm and left ovary 3.5cm with complex cyst 1.7cm\par \par 6/29/2021 Right ovarian mass, surgical excision: Fragments of serous borderline tumor\par \par 3/12/2023 US head + neck- At the site of palpable abnormality in level two of the right neck there is an elongated lymph node which has an otherwise normal appearance. This is probably a benign reactive lymph node.\par \par FMHx: Maternal grandmother: Breast cancer, Paternal grandmother: Ovarian cancer\par \par \par Patient presents for an initial consultation\par Patient reports excision of ovarian masses dating from 12/27/17, 1/19/2019, most recent 6/29/2021. \par No fevers chills repeated infections No B symptoms/ night sweats/ weight loss/ pain\par Reports R cervical LN enlargement first noticed 5 years ago, hasn’t decreased in size. 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 School (Enter Location, City,State)___] : at [unfilled], at the time of the visit. [Medical Office: ([facility])___] : at the medical office located in  [de-identified] : Returns for follow up\par Feels well. Cervical adenopathy persists, same size.

## 2023-07-24 NOTE — ASSESSMENT
[FreeTextEntry1] : 22 year old female PMH history of of serous cystadenomas of the ovaries / borderline tumor,  s/p laparoscopic cystectomy on 12/27/17, s/p laparoscopic excision of right ovarian serosal mass on 6/29/2021 with Dr Jordan.\par \par 3/12/2023 US head + neck- At the site of palpable abnormality in level two of the right neck there is an elongated lymph node which has an otherwise normal appearance. This is probably a benign reactive lymph node.\par \par She is now presenting with R cervical adenopathy x 5 years. No B symptoms. \par s/p core biopsy of right cervical node on 5/24/23 - fragments of lymphoid tissue and fibrous tissue. No carcinoma/ or clonal B cell population seen. Limited sample.\par \par \par Plan: \par Reviewed biopsy results with patient. Negative biopsy but limited sample. She has no B symptoms. Advised observation for now as LAD has persisted for 5 years w/o change.\par RTO 6 months

## 2023-10-12 ENCOUNTER — APPOINTMENT (OUTPATIENT)
Dept: GYNECOLOGIC ONCOLOGY | Facility: CLINIC | Age: 23
End: 2023-10-12

## 2023-11-22 ENCOUNTER — APPOINTMENT (OUTPATIENT)
Dept: GYNECOLOGIC ONCOLOGY | Facility: CLINIC | Age: 23
End: 2023-11-22
Payer: COMMERCIAL

## 2023-11-22 PROCEDURE — 99213 OFFICE O/P EST LOW 20 MIN: CPT

## 2024-01-26 ENCOUNTER — APPOINTMENT (OUTPATIENT)
Dept: GYNECOLOGIC ONCOLOGY | Facility: CLINIC | Age: 24
End: 2024-01-26
Payer: COMMERCIAL

## 2024-01-26 DIAGNOSIS — N83.209 UNSPECIFIED OVARIAN CYST, UNSPECIFIED SIDE: ICD-10-CM

## 2024-01-26 DIAGNOSIS — C56.9 MALIGNANT NEOPLASM OF UNSPECIFIED OVARY: ICD-10-CM

## 2024-01-26 PROCEDURE — 99214 OFFICE O/P EST MOD 30 MIN: CPT | Mod: 95

## 2024-01-26 NOTE — END OF VISIT
[FreeTextEntry3] : Follow up when she has made a decision regarding surgical excision of left ovarian cyst [Time Spent: ___ minutes] : I have spent [unfilled] minutes of time on the encounter.

## 2024-01-26 NOTE — HISTORY OF PRESENT ILLNESS
[Home] : at home, [unfilled] , at the time of the visit. [Other Location: e.g. Home (Enter Location, City,State)___] : at [unfilled] [Verbal consent obtained from patient] : the patient, [unfilled] [FreeTextEntry1] : Pt is a 24 yo with history of recurent borderline serous right ovarian tumor s/p laparoscopic excision fo right ovarian serosal mass on 6/29/21. She had MRI for better characterization of new bilateral ovarian cyst. She presents for further management discussion.

## 2024-01-26 NOTE — ASSESSMENT
[FreeTextEntry1] : Pt is a 24 yo with left ovarian simple cyst without any nodularity or concerning for recurrent borderline tumor. These finidings are reasuring and she likely has only a cystadenoma of the left ovary. The right ovarian cyst has resolved and likely physiologic. Given no nodularity or enhancement was noted on MRI this means surgical excision is not urgent. We did discuss limitations of MRI and if chose not to have the simple cyst removed (which is what I recommend) I would want re-evaluation in 6 months with repeat MRI. Given her history cystectomy is recommended for confirmation of pathologic diagnosis and making sure there is nothing missed on MRI. Cystectomy of the left ovary would be done laparoscopically. The patient will think about the timing of the surgery and follow up when she has made a decision for further discussion of surgical risks and timing.
